# Patient Record
Sex: FEMALE | Race: WHITE
[De-identification: names, ages, dates, MRNs, and addresses within clinical notes are randomized per-mention and may not be internally consistent; named-entity substitution may affect disease eponyms.]

---

## 2017-07-31 ENCOUNTER — HOSPITAL ENCOUNTER (INPATIENT)
Dept: HOSPITAL 23 - P1E | Age: 22
LOS: 1 days | Discharge: LEFT BEFORE DISCHARGE | DRG: 885 | End: 2017-08-01
Attending: PSYCHIATRY & NEUROLOGY | Admitting: PSYCHIATRY & NEUROLOGY
Payer: COMMERCIAL

## 2017-07-31 VITALS — HEIGHT: 62 IN | BODY MASS INDEX: 24.84 KG/M2 | WEIGHT: 135 LBS

## 2017-07-31 DIAGNOSIS — F11.20: ICD-10-CM

## 2017-07-31 DIAGNOSIS — F33.2: Primary | ICD-10-CM

## 2017-07-31 PROCEDURE — HZ2ZZZZ DETOXIFICATION SERVICES FOR SUBSTANCE ABUSE TREATMENT: ICD-10-PCS | Performed by: PSYCHIATRY & NEUROLOGY

## 2017-08-01 LAB
BARBITURATES UR QL SCN: 0.3 MG/DL (ref 0.2–2)
BARBITURATES UR QL SCN: 3.6 G/DL (ref 3.5–5)
BARBITURATES: (no result)
BASOPHIL#: 0 X10E3 (ref 0–0.3)
BASOPHIL%: 0.5 % (ref 0–2.5)
BENZODIAZ UR QL SCN: 11 U/L (ref 10–40)
BENZODIAZ UR QL SCN: 13 U/L (ref 10–42)
BENZODIAZEPINES: (no result)
BLOOD UREA NITROGEN: 10 MG/DL (ref 9–23)
BUN/CREATININE RATIO: 20
BZE UR QL SCN: 65 U/L (ref 32–92)
CALCIUM SERUM: 9 MG/DL (ref 8.4–10.2)
CK MB SERPL-RTO: 13.5 % (ref 11–15.5)
CK MB SERPL-RTO: 33.3 G/DL (ref 30–36)
COCAINE: (no result)
CREATININE SERUM: 0.5 MG/DL (ref 0.6–1.4)
DIFF IND: NO
DX ICD CODE: (no result)
DX ICD CODE: (no result)
EOSINOPHIL#: 0.2 X10E3 (ref 0–0.7)
EOSINOPHIL%: 2.2 % (ref 0–7)
GLOM FILT RATE ESTIMATED: 139.3 ML/MIN (ref 60–?)
GLUCOSE FASTING: 81 MG/DL (ref 70–110)
HEMATOCRIT: 36.7 % (ref 35–45)
HEMOGLOBIN: 12.2 GM/DL (ref 12–16)
KETONES UR QL: 110 MMOL/L (ref 100–111)
KETONES UR QL: 26 MMOL/L (ref 22–31)
LYMPHOCYTE#: 2.6 X10E3 (ref 1–3.5)
LYMPHOCYTE%: 34.3 % (ref 17–45)
MEAN CELL VOLUME: 85.5 FL (ref 83–96)
MEAN CORPUSCULAR HEMOGLOBIN: 28.4 PG (ref 28–34)
MEAN PLATELET VOLUME: 9.9 FL (ref 6.5–11.5)
MONOCYTE#: 0.7 X10E3 (ref 0–1)
MONOCYTE%: 9.2 % (ref 3–12)
NEUTROPHIL#: 4 X10E3 (ref 1.5–7.1)
NEUTROPHIL%: 53.8 % (ref 40–75)
OPIATES: (no result)
PLATELET COUNT: 183 X10E3 (ref 140–420)
POTASSIUM: 4.2 MMOL/L (ref 3.5–5.1)
PROTEIN TOTAL SERUM: 6.3 G/DL (ref 6–8.3)
RED BLOOD COUNT: 4.29 X10E (ref 3.9–5.3)
SODIUM: 142 MMOL/L (ref 135–145)
TRICYCLIC ANTIDEPRESSANTS: (no result)
U HYALINE CASTS AUWI: (no result) /[LPF]
U METHADONE: (no result)
URBCS1 AUWI: (no result) /[HPF] (ref 0–2)
URINE APPEARANCE: CLEAR
URINE BACTERIA AUWI: (no result)
URINE BILIRUBIN: (no result)
URINE BLOOD: (no result)
URINE COLOR: YELLOW
URINE GLUCOSE: (no result) MG/DL
URINE KETONE: (no result)
URINE LEUKOCYTE ESTERASE: (no result)
URINE NITRATE: (no result)
URINE PH: 7.5 (ref 5–8)
URINE PROTEIN: (no result)
URINE SOURCE: (no result)
URINE SPECIFIC GRAVITY: 1.01 (ref 1–1.03)
URINE SQUAMOUS EPITHELIAL CELL: (no result) /[HPF]
URINE UROBILINOGEN: 0.2 MG/DL
UWBCS1 AUWI: (no result) (ref 0–5)
WHITE BLOOD COUNT: 7.5 X10E3 (ref 4–10.5)

## 2020-06-22 ENCOUNTER — PROCEDURE VISIT (OUTPATIENT)
Dept: OBSTETRICS AND GYNECOLOGY | Facility: CLINIC | Age: 25
End: 2020-06-22

## 2020-06-22 ENCOUNTER — INITIAL PRENATAL (OUTPATIENT)
Dept: OBSTETRICS AND GYNECOLOGY | Facility: CLINIC | Age: 25
End: 2020-06-22

## 2020-06-22 VITALS
HEIGHT: 63 IN | BODY MASS INDEX: 25.16 KG/M2 | WEIGHT: 142 LBS | DIASTOLIC BLOOD PRESSURE: 55 MMHG | SYSTOLIC BLOOD PRESSURE: 92 MMHG

## 2020-06-22 DIAGNOSIS — F11.20 BUPRENORPHINE MAINTENANCE TREATMENT AFFECTING PREGNANCY IN FIRST TRIMESTER (HCC): ICD-10-CM

## 2020-06-22 DIAGNOSIS — O99.331 MATERNAL TOBACCO USE IN FIRST TRIMESTER: ICD-10-CM

## 2020-06-22 DIAGNOSIS — Z11.3 SCREEN FOR STD (SEXUALLY TRANSMITTED DISEASE): ICD-10-CM

## 2020-06-22 DIAGNOSIS — O99.321 BUPRENORPHINE MAINTENANCE TREATMENT AFFECTING PREGNANCY IN FIRST TRIMESTER (HCC): ICD-10-CM

## 2020-06-22 DIAGNOSIS — Z78.9 VARICELLA VACCINATION STATUS UNKNOWN: ICD-10-CM

## 2020-06-22 DIAGNOSIS — O09.91 HIGH-RISK PREGNANCY IN FIRST TRIMESTER: Primary | ICD-10-CM

## 2020-06-22 DIAGNOSIS — Z34.91 PREGNANCY WITH UNCERTAIN DATES IN FIRST TRIMESTER: Primary | ICD-10-CM

## 2020-06-22 DIAGNOSIS — Z12.4 SCREENING FOR CERVICAL CANCER: ICD-10-CM

## 2020-06-22 DIAGNOSIS — B18.2 CHRONIC HEPATITIS C COMPLICATING PREGNANCY, ANTEPARTUM (HCC): ICD-10-CM

## 2020-06-22 DIAGNOSIS — O98.419 CHRONIC HEPATITIS C COMPLICATING PREGNANCY, ANTEPARTUM (HCC): ICD-10-CM

## 2020-06-22 DIAGNOSIS — Z34.91 UNCERTAIN DATES, ANTEPARTUM, FIRST TRIMESTER: ICD-10-CM

## 2020-06-22 LAB
GLUCOSE UR STRIP-MCNC: NEGATIVE MG/DL
PROT UR STRIP-MCNC: ABNORMAL MG/DL

## 2020-06-22 PROCEDURE — 76817 TRANSVAGINAL US OBSTETRIC: CPT | Performed by: OBSTETRICS & GYNECOLOGY

## 2020-06-22 PROCEDURE — 99214 OFFICE O/P EST MOD 30 MIN: CPT | Performed by: OBSTETRICS & GYNECOLOGY

## 2020-06-22 RX ORDER — BUPRENORPHINE HYDROCHLORIDE 8 MG/1
TABLET SUBLINGUAL
COMMUNITY
Start: 2020-06-16

## 2020-06-22 RX ORDER — SWAB
1 SWAB, NON-MEDICATED MISCELLANEOUS DAILY
Qty: 90 EACH | Refills: 3 | Status: SHIPPED | OUTPATIENT
Start: 2020-06-22 | End: 2020-08-26

## 2020-06-22 NOTE — PROGRESS NOTES
Initial ob visit     CC- Here for care of pregnancy     Glenna Miller is being seen today for her first obstetrical visit.  She is a 24 y.o.    10w1d gestation.     #: 1, Date: 09/29/15, Sex: Male, Weight: 2580 g (5 lb 11 oz), GA: 39w0d, Delivery: Vaginal, Spontaneous, Apgar1: None, Apgar5: None, Living: Living, Birth Comments: None    #: 2, Date: None, Sex: None, Weight: None, GA: None, Delivery: None, Apgar1: None, Apgar5: None, Living: None, Birth Comments: None      Current obstetric complaints : unsure dates   Prior obstetric issues, potential pregnancy concerns: Hep C  Family history of genetic issues (includes FOB): none  Prior infections concerning in pregnancy (Rash, fever in last 2 weeks): none  Varicella Hx -negative history  Prior testing for Cystic Fibrosis Carrier or Sickle Cell Trait- unknown status  Prepregnancy BMI - Body mass index is 25.15 kg/m².    Past Medical History:   Diagnosis Date   • Hepatitis C        History reviewed. No pertinent surgical history.      Current Outpatient Medications:   •  buprenorphine (SUBUTEX) 8 MG sublingual tablet SL tablet, DISSOLVE 1 AND A HALF TS UNDER THE TONGUE QD, Disp: , Rfl:     No Known Allergies    Social History     Socioeconomic History   • Marital status: Single     Spouse name: Not on file   • Number of children: Not on file   • Years of education: Not on file   • Highest education level: Not on file   Tobacco Use   • Smoking status: Current Every Day Smoker     Types: Cigarettes   • Smokeless tobacco: Never Used   Substance and Sexual Activity   • Alcohol use: Not Currently     Frequency: Never   • Drug use: Not Currently   • Sexual activity: Yes     Partners: Male       Family History   Problem Relation Age of Onset   • Breast cancer Neg Hx    • Ovarian cancer Neg Hx    • Uterine cancer Neg Hx    • Colon cancer Neg Hx    • Deep vein thrombosis Neg Hx    • Pulmonary embolism Neg Hx        Review of systems     Constitutional : Nausea, fatigue  "nausea present, fatigue some    : Vaginal bleeding, cramping none, none   Breast Tenderness : yes  A comprehensive review of systems was negative.     Objective    BP 92/55   Ht 160 cm (63\")   Wt 64.4 kg (142 lb)   BMI 25.15 kg/m²       General Appearance:    Alert, cooperative, in no acute distress, habitus normal    Head:    Normocephalic, without obvious abnormality, atraumatic   Eyes:            Lids and lashes normal, conjunctivae and sclerae normal, no   icterus, no pallor, corneas clear   Ears:    Ears appear intact with no abnormalities noted       Neck:   No adenopathy, supple, trachea midline, no thyromegaly   Back:     No kyphosis present, no scoliosis present,                       Lungs:     Clear to auscultation,respirations regular, even and     unlabored    Heart:    Regular rhythm and normal rate, normal S1 and S2, no            murmur, no gallop, no rub, no click   Breast Exam:    No masses, No nipple discharge   Abdomen:     Normal bowel sounds, no masses, no organomegaly, soft        non-tender, non-distended, no guarding, no rebound                 tenderness   Genitalia:    Vulva - BUS-WNL, NEFG    Vagina - No discharge, No bleeding    Cervix - No Lesions, closed     Uterus - Consistent with 7-8 weeks, midplane     Adnexa - No mass, NT    Pelvimetry - clinically adequate, gynecoid pelvis     Extremities:   Moves all extremities well, no edema, no cyanosis, no              redness   Pulses:   Pulses palpable and equal bilaterally   Skin:   No bleeding, bruising or rash   Lymph nodes:   No palpable adenopathy   Neurologic:   Sensation intact, A&O times 3      Assessment    1) Pregnancy at 10w1d   2) Uncertain dates  Trial dating scan today   HCG, quant as well.   3) Subutex use in pregnancy   Discussed [place] vs recidivism   To continue   4) Hepatitis C in pregnancy   Baseline viral load, CMP   CMP each trimester   5) Smoking in pregnancy   Tobacco use in pregnancy addressed   1) Increases risk " for IUGR, abruption   2) Infant will be exposed and therefor withdrawal once delivered and the cord is cut  3) Recommend weaning as ideal way to try and reduce risk to stop   Typically encourage to set goals every two weeks with a reduction by 50% in use of tobacco. Once down to a few a day, set quit day in the same manor.       COVID-19 and work reviewed   - per CDC and others not worse in pregnancy, but are consider a high risk category -   - unknown as to risks of vertical transmission, increased in stillbirth or miscarriage (conflicting data)   - Currently recommend only working if can abide by CDC recommendations on social distancing   - Currently recommend stopping work at least 2 weeks prior to delivery (so consider 35-37 weeks) to decrease risk of active infection during L&D           Plan    Initial labs ordered   Ultrasound ordered   Cultures ordered    Activity recommendation : 150 minutes/week of moderate intensity aerobic activity unless we limit for bleeding, hypertension or other pregnancy complication   Patient is on Prenatal vitamins  Problem list reviewed and updated.  Reviewed routine prenatal care with the office to include but not limited to   Zika (travel restrictions/ok to use insect repellant), not to changing cat litter, food restrictions, avoidance of alcohol, tobacco and drugs and saunas/hot tubs.   All questions answered.     Edu Baltazar MD   6/22/2020  13:27

## 2020-06-22 NOTE — PROGRESS NOTES
Patient tried glasses to correct vision, was unable to tolerate due to high myopia OD caused diplopia. OD has shifted more nearsighted due to cataract progression. Astigmatism present OD>OS, recommend TORIC IOL OD to correct astigmatism. May consider ROF/TORIC, will discuss lenses in further detail at [facility] appt. Ultrasound completed....SP

## 2020-06-24 ENCOUNTER — TELEPHONE (OUTPATIENT)
Dept: OBSTETRICS AND GYNECOLOGY | Facility: CLINIC | Age: 25
End: 2020-06-24

## 2020-06-24 LAB
BACTERIA UR CULT: NORMAL
BACTERIA UR CULT: NORMAL

## 2020-06-24 NOTE — TELEPHONE ENCOUNTER
"Enrico Mchugh fax to suggest PA for prenatal vitamins. Call and let them know that is RIDICULOUS - Per Epic I can not just write generic - \"prenatal vitamin\" - but that is all she needs. There should be an option on her formulary that fits that bill. Please change to that option.     Thanks  Dr. Baltazar"

## 2020-06-25 ENCOUNTER — TELEPHONE (OUTPATIENT)
Dept: OBSTETRICS AND GYNECOLOGY | Facility: CLINIC | Age: 25
End: 2020-06-25

## 2020-06-25 LAB
C TRACH RRNA SPEC QL NAA+PROBE: NEGATIVE
CONV .: NORMAL
CYTOLOGIST CVX/VAG CYTO: NORMAL
CYTOLOGY CVX/VAG DOC CYTO: NORMAL
CYTOLOGY CVX/VAG DOC THIN PREP: NORMAL
DX ICD CODE: NORMAL
HIV 1 & 2 AB SER-IMP: NORMAL
N GONORRHOEA RRNA SPEC QL NAA+PROBE: NEGATIVE
OTHER STN SPEC: NORMAL
STAT OF ADQ CVX/VAG CYTO-IMP: NORMAL
T VAGINALIS DNA SPEC QL NAA+PROBE: NEGATIVE

## 2020-06-28 LAB
AMPHETAMINES UR QL SCN: NEGATIVE NG/ML
BARBITURATES UR QL SCN: NEGATIVE NG/ML
BENZODIAZ UR QL: NEGATIVE NG/ML
BZE UR QL: NEGATIVE NG/ML
CANNABINOIDS UR CFM-MCNC: POSITIVE NG/ML
METHADONE UR QL SCN: NEGATIVE NG/ML
OPIATES UR QL: NEGATIVE NG/ML
PCP UR QL: NEGATIVE NG/ML
PROPOXYPH UR QL SCN: NEGATIVE NG/ML

## 2020-06-29 ENCOUNTER — TELEPHONE (OUTPATIENT)
Dept: OBSTETRICS AND GYNECOLOGY | Facility: CLINIC | Age: 25
End: 2020-06-29

## 2020-06-29 NOTE — TELEPHONE ENCOUNTER
----- Message from Aleksandra Morris MA sent at 6/25/2020  3:35 PM EDT -----  L/m for pt/linda  ----- Message -----  From: Edu Baltazar MD  Sent: 6/25/2020   8:42 AM EDT  To: HOMERO Gudino, please let her know the STD screen for Chlamydia, Gonorrhea and trichomoniasis is negative. Thanks, Dr. Baltazar

## 2020-07-27 ENCOUNTER — ROUTINE PRENATAL (OUTPATIENT)
Dept: OBSTETRICS AND GYNECOLOGY | Facility: CLINIC | Age: 25
End: 2020-07-27

## 2020-07-27 VITALS — BODY MASS INDEX: 24.27 KG/M2 | WEIGHT: 137 LBS | DIASTOLIC BLOOD PRESSURE: 49 MMHG | SYSTOLIC BLOOD PRESSURE: 106 MMHG

## 2020-07-27 DIAGNOSIS — O98.419 CHRONIC HEPATITIS C COMPLICATING PREGNANCY, ANTEPARTUM (HCC): ICD-10-CM

## 2020-07-27 DIAGNOSIS — F11.20 BUPRENORPHINE MAINTENANCE TREATMENT AFFECTING PREGNANCY IN SECOND TRIMESTER (HCC): ICD-10-CM

## 2020-07-27 DIAGNOSIS — B18.2 CHRONIC HEPATITIS C COMPLICATING PREGNANCY, ANTEPARTUM (HCC): ICD-10-CM

## 2020-07-27 DIAGNOSIS — Z36.9 ANTENATAL SCREENING ENCOUNTER: ICD-10-CM

## 2020-07-27 DIAGNOSIS — O99.322 BUPRENORPHINE MAINTENANCE TREATMENT AFFECTING PREGNANCY IN SECOND TRIMESTER (HCC): ICD-10-CM

## 2020-07-27 DIAGNOSIS — O09.92 HIGH-RISK PREGNANCY IN SECOND TRIMESTER: Primary | ICD-10-CM

## 2020-07-27 LAB
GLUCOSE UR STRIP-MCNC: NEGATIVE MG/DL
PROT UR STRIP-MCNC: ABNORMAL MG/DL

## 2020-07-27 PROCEDURE — 99214 OFFICE O/P EST MOD 30 MIN: CPT | Performed by: OBSTETRICS & GYNECOLOGY

## 2020-07-27 NOTE — PROGRESS NOTES
OB follow up     Glenna Miller is a 24 y.o.  15w1d being seen today for her obstetrical visit.  Patient reports no complaints. Fetal movement: normal.    Her prenatal care is complicated by (and status): Subutex, Hep C and THC/Tobacco     Review of Systems  Cramping/contractions : none   Vaginal bleeding: none   Fetal movement too early     /49   Wt 62.1 kg (137 lb)   LMP 2020 (Approximate)   BMI 24.27 kg/m²     FHT: 156 BPM   Uterine Size: 15 cm       Assessment    1) pregnancy at 15w1d   Quickening reviewed.   MSAFP-4 for down syndrome order   Ultrasound for anatomy next visit.   Cultures and ultrasound reviewed, to do blood work today   2) Subutex use - Stable - no dose change   3) Hep C - still looking for baseline   4) THC - tobacco   Discussed use in pregnancy       Plan    Reviewed this stage of pregnancy  Problem list updated   Follow up in 4 weeks    Edu Baltazar MD   2020  14:01

## 2020-07-28 LAB
ABO GROUP BLD: ABNORMAL
ALBUMIN SERPL-MCNC: 4 G/DL (ref 3.9–5)
ALBUMIN/GLOB SERPL: 1.5 {RATIO} (ref 1.2–2.2)
ALP SERPL-CCNC: 70 IU/L (ref 39–117)
ALT SERPL-CCNC: 13 IU/L (ref 0–32)
AST SERPL-CCNC: 14 IU/L (ref 0–40)
BASOPHILS # BLD AUTO: 0 X10E3/UL (ref 0–0.2)
BASOPHILS NFR BLD AUTO: 0 %
BILIRUB SERPL-MCNC: <0.2 MG/DL (ref 0–1.2)
BLD GP AB SCN SERPL QL: NEGATIVE
BUN SERPL-MCNC: 10 MG/DL (ref 6–20)
BUN/CREAT SERPL: 20 (ref 9–23)
CALCIUM SERPL-MCNC: 9.1 MG/DL (ref 8.7–10.2)
CHLORIDE SERPL-SCNC: 106 MMOL/L (ref 96–106)
CO2 SERPL-SCNC: 18 MMOL/L (ref 20–29)
CREAT SERPL-MCNC: 0.49 MG/DL (ref 0.57–1)
EOSINOPHIL # BLD AUTO: 0.1 X10E3/UL (ref 0–0.4)
EOSINOPHIL NFR BLD AUTO: 1 %
ERYTHROCYTE [DISTWIDTH] IN BLOOD BY AUTOMATED COUNT: 14.3 % (ref 11.7–15.4)
GLOBULIN SER CALC-MCNC: 2.7 G/DL (ref 1.5–4.5)
GLUCOSE SERPL-MCNC: 81 MG/DL (ref 65–99)
HBV SURFACE AG SERPL QL IA: NEGATIVE
HCG INTACT+B SERPL-ACNC: NORMAL MIU/ML
HCT VFR BLD AUTO: 35.2 % (ref 34–46.6)
HCV AB S/CO SERPL IA: 4.4 S/CO RATIO (ref 0–0.9)
HGB BLD-MCNC: 11.6 G/DL (ref 11.1–15.9)
HIV 1+2 AB+HIV1 P24 AG SERPL QL IA: NON REACTIVE
IMM GRANULOCYTES # BLD AUTO: 0 X10E3/UL (ref 0–0.1)
IMM GRANULOCYTES NFR BLD AUTO: 0 %
LYMPHOCYTES # BLD AUTO: 1.5 X10E3/UL (ref 0.7–3.1)
LYMPHOCYTES NFR BLD AUTO: 19 %
MCH RBC QN AUTO: 28.5 PG (ref 26.6–33)
MCHC RBC AUTO-ENTMCNC: 33 G/DL (ref 31.5–35.7)
MCV RBC AUTO: 87 FL (ref 79–97)
MONOCYTES # BLD AUTO: 0.4 X10E3/UL (ref 0.1–0.9)
MONOCYTES NFR BLD AUTO: 5 %
NEUTROPHILS # BLD AUTO: 5.9 X10E3/UL (ref 1.4–7)
NEUTROPHILS NFR BLD AUTO: 75 %
PLATELET # BLD AUTO: 132 X10E3/UL (ref 150–450)
POTASSIUM SERPL-SCNC: 3.9 MMOL/L (ref 3.5–5.2)
PROT SERPL-MCNC: 6.7 G/DL (ref 6–8.5)
RBC # BLD AUTO: 4.07 X10E6/UL (ref 3.77–5.28)
RH BLD: NEGATIVE
RPR SER QL: NON REACTIVE
RUBV IGG SERPL IA-ACNC: 6.14 INDEX
SODIUM SERPL-SCNC: 141 MMOL/L (ref 134–144)
VZV IGG SER IA-ACNC: <135 INDEX
WBC # BLD AUTO: 7.8 X10E3/UL (ref 3.4–10.8)

## 2020-07-29 PROBLEM — Z28.39 MATERNAL VARICELLA, NON-IMMUNE: Status: ACTIVE | Noted: 2020-07-29

## 2020-07-29 PROBLEM — O09.899 MATERNAL VARICELLA, NON-IMMUNE: Status: ACTIVE | Noted: 2020-07-29

## 2020-07-29 LAB
HCV RNA SERPL NAA+PROBE-ACNC: NORMAL IU/ML
TEST INFORMATION: NORMAL

## 2020-08-03 ENCOUNTER — TELEPHONE (OUTPATIENT)
Dept: OBSTETRICS AND GYNECOLOGY | Facility: CLINIC | Age: 25
End: 2020-08-03

## 2020-08-03 LAB
2ND TRIMESTER 4 SCREEN SERPL-IMP: ABNORMAL
2ND TRIMESTER 4 SCREEN SERPL-IMP: ABNORMAL
AFP ADJ MOM SERPL: 0.84
AFP SERPL-MCNC: 26.6 NG/ML
AGE AT DELIVERY: 24.2 YR
FET TS 18 RISK FROM MAT AGE: ABNORMAL
FET TS 21 RISK FROM MAT AGE: 1063
GA METHOD: ABNORMAL
GA: 15.4 WEEKS
HCG ADJ MOM SERPL: 1.13
HCG SERPL-ACNC: ABNORMAL MIU/ML
IDDM PATIENT QL: NO
INHIBIN A ADJ MOM SERPL: 2.3
INHIBIN A SERPL-MCNC: 428.54 PG/ML
LABORATORY COMMENT REPORT: ABNORMAL
MULTIPLE PREGNANCY: NO
NEURAL TUBE DEFECT RISK FETUS: ABNORMAL %
RESULT: ABNORMAL
TS 18 RISK FETUS: ABNORMAL
TS 21 RISK FETUS: 230
U ESTRIOL ADJ MOM SERPL: 0.54
U ESTRIOL SERPL-MCNC: 0.43 NG/ML

## 2020-08-03 NOTE — TELEPHONE ENCOUNTER
"----- Message from Edu Baltazar MD sent at 7/30/2020  9:49 AM EDT -----  Aleksandra, a few things wrong here - one tried to call and \"call could not be completed at this time\" however, in review of this result. Someone put in incorrect information! Which can affect this test - Her gestational age is 15 weeks and 3 days when drawn with EDC of 1/17/21- they seem to be using a completely different date with gestational age of 18 weeks and 4 days (based on LNMP of 3/20/20) can you have [facility] fix this and re run numbers! Thanks, Dr. Baltazar  "

## 2020-08-03 NOTE — TELEPHONE ENCOUNTER
----- Message from Aleksandra Morris MA sent at 7/31/2020  3:21 PM EDT -----  Call could not be completed/linda  ----- Message -----  From: Edu Baltazar MD  Sent: 7/29/2020   4:41 PM EDT  To: HOMERO Gudino, good news. Appears her Hep C is a prior infection, not active or chronic. Please let her know. Thanks, Dr. Baltazar

## 2020-08-26 ENCOUNTER — ROUTINE PRENATAL (OUTPATIENT)
Dept: OBSTETRICS AND GYNECOLOGY | Facility: CLINIC | Age: 25
End: 2020-08-26

## 2020-08-26 ENCOUNTER — TELEPHONE (OUTPATIENT)
Dept: OBSTETRICS AND GYNECOLOGY | Facility: CLINIC | Age: 25
End: 2020-08-26

## 2020-08-26 ENCOUNTER — PROCEDURE VISIT (OUTPATIENT)
Dept: OBSTETRICS AND GYNECOLOGY | Facility: CLINIC | Age: 25
End: 2020-08-26

## 2020-08-26 VITALS — BODY MASS INDEX: 24.45 KG/M2 | WEIGHT: 138 LBS

## 2020-08-26 DIAGNOSIS — O09.92 HIGH-RISK PREGNANCY IN SECOND TRIMESTER: Primary | ICD-10-CM

## 2020-08-26 DIAGNOSIS — D69.6 GESTATIONAL THROMBOCYTOPENIA WITHOUT HEMORRHAGE IN SECOND TRIMESTER (HCC): ICD-10-CM

## 2020-08-26 DIAGNOSIS — O99.322 BUPRENORPHINE MAINTENANCE TREATMENT AFFECTING PREGNANCY IN SECOND TRIMESTER (HCC): ICD-10-CM

## 2020-08-26 DIAGNOSIS — O99.112 GESTATIONAL THROMBOCYTOPENIA WITHOUT HEMORRHAGE IN SECOND TRIMESTER (HCC): ICD-10-CM

## 2020-08-26 DIAGNOSIS — O98.419 CHRONIC HEPATITIS C COMPLICATING PREGNANCY, ANTEPARTUM (HCC): ICD-10-CM

## 2020-08-26 DIAGNOSIS — R77.2 ABNORMAL AFP3 TEST: ICD-10-CM

## 2020-08-26 DIAGNOSIS — B18.2 CHRONIC HEPATITIS C COMPLICATING PREGNANCY, ANTEPARTUM (HCC): ICD-10-CM

## 2020-08-26 DIAGNOSIS — F11.20 BUPRENORPHINE MAINTENANCE TREATMENT AFFECTING PREGNANCY IN SECOND TRIMESTER (HCC): ICD-10-CM

## 2020-08-26 DIAGNOSIS — Z36.89 ENCOUNTER FOR FETAL ANATOMIC SURVEY: Primary | ICD-10-CM

## 2020-08-26 LAB
GLUCOSE UR STRIP-MCNC: NEGATIVE MG/DL
PROT UR STRIP-MCNC: ABNORMAL MG/DL

## 2020-08-26 PROCEDURE — 99214 OFFICE O/P EST MOD 30 MIN: CPT | Performed by: OBSTETRICS & GYNECOLOGY

## 2020-08-26 PROCEDURE — 76805 OB US >/= 14 WKS SNGL FETUS: CPT | Performed by: OBSTETRICS & GYNECOLOGY

## 2020-08-26 RX ORDER — PRENATAL VIT/IRON FUM/FOLIC AC 27MG-0.8MG
1 TABLET ORAL DAILY
COMMUNITY
Start: 2020-06-25 | End: 2023-02-06

## 2020-08-26 NOTE — TELEPHONE ENCOUNTER
"----- Message from Aleksandra Morris MA sent at 8/3/2020  4:20 PM EDT -----  Letter mailed/linda  ----- Message -----  From: Edu Baltazar MD  Sent: 8/3/2020   3:12 PM EDT  To: HOMERO Gudino, can you try and get ahold of her (letter or call). I tried again today and \"call can not be completed\"  - MSAFP-4 abnormal (+ for down syndrome, but rate is now only 1 in 230) - so we need  Her to do NIPT ([id number]) - Thanks, Dr. Baltazar    "

## 2020-08-26 NOTE — PROGRESS NOTES
OB follow up     Glenna Miller is a 24 y.o.  19w3d being seen today for her obstetrical visit.  Patient reports no complaints. Fetal movement: normal.    Her prenatal care is complicated by (and status): Subutex in pregnancy     Review of Systems  Cramping/contractions : none   Vaginal bleeding: none   Fetal movement good    Wt 62.6 kg (138 lb)   LMP 2020 (Approximate)   BMI 24.45 kg/m²     FHT: 134 BPM   Uterine Size: 18 cm       Assessment    Problems Addressed this Visit     None      Visit Diagnoses     High-risk pregnancy in second trimester    -  Primary    Buprenorphine maintenance treatment affecting pregnancy in second trimester (CMS/HCC)        Chronic hepatitis C complicating pregnancy, antepartum (CMS/HCC)        Abnormal AFP3 test        Relevant Orders    [id number] PLUS Core+SCA - Blood,    Gestational thrombocytopenia without hemorrhage in second trimester (CMS/HCC)        Relevant Orders    CBC & Differential          1) pregnancy at 19w3d   Anatomy scan reviewed, essentially normal   2) Subutex in pregnancy   Stable no changes currently   3) Hep C +   Viral load and LFTs negative - suspect she cleared infection on own.   4) Gestational thrombocytopenia   Platelets at intake 132k, repeating today with blood draw   5) Abnormal MSAFP-4   Trisomy 21 is 1:230, so check NIPT   Expectations reviewed   (had been unable to reach by phone or letter- reviewed)   6) Varicella non-immune   Varivax postpartum         Plan    Reviewed this stage of pregnancy  Problem list updated   Follow up in 4 weeks    Edu Baltazar MD   2020  12:08

## 2020-10-12 ENCOUNTER — TELEPHONE (OUTPATIENT)
Dept: OBSTETRICS AND GYNECOLOGY | Facility: CLINIC | Age: 25
End: 2020-10-12

## 2020-10-21 ENCOUNTER — ROUTINE PRENATAL (OUTPATIENT)
Dept: OBSTETRICS AND GYNECOLOGY | Facility: CLINIC | Age: 25
End: 2020-10-21

## 2020-10-21 VITALS — WEIGHT: 144 LBS | BODY MASS INDEX: 25.51 KG/M2 | DIASTOLIC BLOOD PRESSURE: 58 MMHG | SYSTOLIC BLOOD PRESSURE: 109 MMHG

## 2020-10-21 DIAGNOSIS — O26.892 RH NEGATIVE STATUS DURING PREGNANCY IN SECOND TRIMESTER: ICD-10-CM

## 2020-10-21 DIAGNOSIS — Z67.91 RH NEGATIVE STATUS DURING PREGNANCY IN SECOND TRIMESTER: ICD-10-CM

## 2020-10-21 DIAGNOSIS — F11.20 BUPRENORPHINE MAINTENANCE TREATMENT AFFECTING PREGNANCY IN SECOND TRIMESTER (HCC): ICD-10-CM

## 2020-10-21 DIAGNOSIS — Z36.9 ANTENATAL SCREENING ENCOUNTER: ICD-10-CM

## 2020-10-21 DIAGNOSIS — O09.92 HIGH-RISK PREGNANCY IN SECOND TRIMESTER: Primary | ICD-10-CM

## 2020-10-21 DIAGNOSIS — O99.322 BUPRENORPHINE MAINTENANCE TREATMENT AFFECTING PREGNANCY IN SECOND TRIMESTER (HCC): ICD-10-CM

## 2020-10-21 DIAGNOSIS — O98.419 CHRONIC HEPATITIS C COMPLICATING PREGNANCY, ANTEPARTUM (HCC): ICD-10-CM

## 2020-10-21 DIAGNOSIS — R77.2 ABNORMAL AFP3 TEST: ICD-10-CM

## 2020-10-21 DIAGNOSIS — O99.112 GESTATIONAL THROMBOCYTOPENIA WITHOUT HEMORRHAGE IN SECOND TRIMESTER (HCC): ICD-10-CM

## 2020-10-21 DIAGNOSIS — B18.2 CHRONIC HEPATITIS C COMPLICATING PREGNANCY, ANTEPARTUM (HCC): ICD-10-CM

## 2020-10-21 DIAGNOSIS — D69.6 GESTATIONAL THROMBOCYTOPENIA WITHOUT HEMORRHAGE IN SECOND TRIMESTER (HCC): ICD-10-CM

## 2020-10-21 LAB
GLUCOSE UR STRIP-MCNC: NEGATIVE MG/DL
PROT UR STRIP-MCNC: ABNORMAL MG/DL

## 2020-10-21 PROCEDURE — 96372 THER/PROPH/DIAG INJ SC/IM: CPT | Performed by: OBSTETRICS & GYNECOLOGY

## 2020-10-21 PROCEDURE — 99214 OFFICE O/P EST MOD 30 MIN: CPT | Performed by: OBSTETRICS & GYNECOLOGY

## 2020-10-21 NOTE — PROGRESS NOTES
OB follow up     Glenna Miller is a 25 y.o.  27w3d being seen today for her obstetrical visit.  Patient reports no complaints. Fetal movement: normal.    Her prenatal care is complicated by (and status): Subutex in pregnancy     Review of Systems  Cramping/contractions : none   Vaginal bleeding: none   Fetal movement good     /58   Wt 65.3 kg (144 lb)   LMP 2020 (Approximate)   BMI 25.51 kg/m²     FHT: 156 BPM   Uterine Size: 26 cm       Assessment    Problems Addressed this Visit     None      Visit Diagnoses     High-risk pregnancy in second trimester    -  Primary    Buprenorphine maintenance treatment affecting pregnancy in second trimester (CMS/HCC)        Chronic hepatitis C complicating pregnancy, antepartum (CMS/HCC)        Relevant Orders    Comprehensive Metabolic Panel    Abnormal AFP3 test        Relevant Orders    [id number] PLUS Core+SCA - Blood,    US Ob Follow Up Transabdominal Approach    Gestational thrombocytopenia without hemorrhage in second trimester (CMS/HCC)        Relevant Orders    CBC & Differential     screening encounter        Relevant Orders    CBC & Differential    Gestational Screen 1 Hr ([facility])    Rh negative status during pregnancy in second trimester        Relevant Orders    Antibody Screen      Diagnoses       Codes Comments    High-risk pregnancy in second trimester    -  Primary ICD-10-CM: O09.92  ICD-9-CM: V23.9     Buprenorphine maintenance treatment affecting pregnancy in second trimester (CMS/HCC)     ICD-10-CM: O99.322, F11.20  ICD-9-CM: 648.33, 304.00, V58.69     Chronic hepatitis C complicating pregnancy, antepartum (CMS/HCC)     ICD-10-CM: O98.419, B18.2  ICD-9-CM: 647.63, 070.54     Abnormal AFP3 test     ICD-10-CM: R77.2  ICD-9-CM: 790.99     Gestational thrombocytopenia without hemorrhage in second trimester (CMS/HCC)     ICD-10-CM: O99.112, D69.6  ICD-9-CM: 649.33, 287.5      screening encounter     ICD-10-CM:  Z36.9  ICD-9-CM: V28.9     Rh negative status during pregnancy in second trimester     ICD-10-CM: O26.892, Z67.91  ICD-9-CM: 646.83           1) pregnancy at 27w3d   GTT/CBC/ABS today  Rhogam given, no reaction noted  Peds, prenatal classes and tours  TDaP and flu recommended  Questions about L&D, anesthesia, breast feeding and birth control encouraged.   2) Subutex in pregnancy - stable - no change in dose   3) Hep C   Viral testing negative, possible old infection   Still need LFTs today and HIV, RPR in third trimester.   4) Gestational thromobcytopenia,  Will see with CBC  5) Abnormal MSAFP-4, still no NIPT can see today as well.   Check growth next visit     4) declines Flu vaccine        Plan    Reviewed this stage of pregnancy  Problem list updated   Follow up in 2 weeks    Edu Baltazar MD   10/21/2020  12:57 EDT

## 2020-10-22 LAB
ALBUMIN SERPL-MCNC: 3.7 G/DL (ref 3.9–5)
ALBUMIN/GLOB SERPL: 1.3 {RATIO} (ref 1.2–2.2)
ALP SERPL-CCNC: 115 IU/L (ref 39–117)
ALT SERPL-CCNC: 9 IU/L (ref 0–32)
AST SERPL-CCNC: 14 IU/L (ref 0–40)
BASOPHILS # BLD AUTO: 0 X10E3/UL (ref 0–0.2)
BASOPHILS NFR BLD AUTO: 0 %
BILIRUB SERPL-MCNC: <0.2 MG/DL (ref 0–1.2)
BLD GP AB SCN SERPL QL: NEGATIVE
BUN SERPL-MCNC: 11 MG/DL (ref 6–20)
BUN/CREAT SERPL: 29 (ref 9–23)
CALCIUM SERPL-MCNC: 8.4 MG/DL (ref 8.7–10.2)
CHLORIDE SERPL-SCNC: 103 MMOL/L (ref 96–106)
CO2 SERPL-SCNC: 20 MMOL/L (ref 20–29)
CREAT SERPL-MCNC: 0.38 MG/DL (ref 0.57–1)
EOSINOPHIL # BLD AUTO: 0.1 X10E3/UL (ref 0–0.4)
EOSINOPHIL NFR BLD AUTO: 1 %
ERYTHROCYTE [DISTWIDTH] IN BLOOD BY AUTOMATED COUNT: 12.6 % (ref 11.7–15.4)
GLOBULIN SER CALC-MCNC: 2.8 G/DL (ref 1.5–4.5)
GLUCOSE 1H P 50 G GLC PO SERPL-MCNC: 85 MG/DL (ref 65–139)
GLUCOSE SERPL-MCNC: 84 MG/DL (ref 65–99)
HCT VFR BLD AUTO: 31.6 % (ref 34–46.6)
HGB BLD-MCNC: 10.5 G/DL (ref 11.1–15.9)
IMM GRANULOCYTES # BLD AUTO: 0 X10E3/UL (ref 0–0.1)
IMM GRANULOCYTES NFR BLD AUTO: 0 %
LYMPHOCYTES # BLD AUTO: 2.1 X10E3/UL (ref 0.7–3.1)
LYMPHOCYTES NFR BLD AUTO: 19 %
MCH RBC QN AUTO: 29.6 PG (ref 26.6–33)
MCHC RBC AUTO-ENTMCNC: 33.2 G/DL (ref 31.5–35.7)
MCV RBC AUTO: 89 FL (ref 79–97)
MONOCYTES # BLD AUTO: 0.4 X10E3/UL (ref 0.1–0.9)
MONOCYTES NFR BLD AUTO: 4 %
NEUTROPHILS # BLD AUTO: 8.5 X10E3/UL (ref 1.4–7)
NEUTROPHILS NFR BLD AUTO: 76 %
PLATELET # BLD AUTO: 139 X10E3/UL (ref 150–450)
POTASSIUM SERPL-SCNC: 4.1 MMOL/L (ref 3.5–5.2)
PROT SERPL-MCNC: 6.5 G/DL (ref 6–8.5)
RBC # BLD AUTO: 3.55 X10E6/UL (ref 3.77–5.28)
SODIUM SERPL-SCNC: 138 MMOL/L (ref 134–144)
WBC # BLD AUTO: 11.2 X10E3/UL (ref 3.4–10.8)

## 2020-10-22 RX ORDER — FERROUS SULFATE 325(65) MG
325 TABLET ORAL
Qty: 30 TABLET | Refills: 6 | Status: SHIPPED | OUTPATIENT
Start: 2020-10-22 | End: 2021-01-15 | Stop reason: HOSPADM

## 2020-10-26 ENCOUNTER — TELEPHONE (OUTPATIENT)
Dept: OBSTETRICS AND GYNECOLOGY | Facility: CLINIC | Age: 25
End: 2020-10-26

## 2020-10-26 NOTE — TELEPHONE ENCOUNTER
EC number is not a working number.  Unable to reach pt. Will try and reach her @ next office visit/linda

## 2020-10-26 NOTE — TELEPHONE ENCOUNTER
----- Message from Aleksandra Morris MA sent at 10/23/2020  1:47 PM EDT -----  L/m for pt/linda  ----- Message -----  From: Aleksandra Morris MA  Sent: 10/22/2020   2:44 PM EDT  To: Aleksandra Morris MA    L/m for pt/linda  ----- Message -----  From: Edu Baltazar MD  Sent: 10/22/2020  11:23 AM EDT  To: HOMERO Gudino, Anemic on her BS/CBC screen. I sent in Iron to treat. Passed her glucose test. Platelets stable at 139k. Please let her know. Thanks, Dr. Baltazar

## 2020-10-28 LAB
CFDNA.FET/CFDNA.TOTAL SFR FETUS: ABNORMAL %
CITATION REF LAB TEST: ABNORMAL
FET 13+18+21+X+Y ANEUP PLAS.CFDNA: ABNORMAL
FET CHR 21 TS PLAS.CFDNA QL: ABNORMAL
FET MS X RISK WBC.DNA+CFDNA QL: ABNORMAL
FET SEX PLAS.CFDNA DOSAGE CFDNA: ABNORMAL
FET TS 13 RISK PLAS.CFDNA QL: ABNORMAL
FET TS 18 RISK WBC.DNA+CFDNA QL: ABNORMAL
FET X + Y ANEUP RISK PLAS.CFDNA SEQ-IMP: ABNORMAL
GA EST FROM CONCEPTION DATE: ABNORMAL D
GESTATIONAL AGE > 9:: ABNORMAL
LAB DIRECTOR NAME PROVIDER: ABNORMAL
LAB DIRECTOR NAME PROVIDER: ABNORMAL
LABORATORY COMMENT REPORT: ABNORMAL
LIMITATIONS OF THE TEST: ABNORMAL
NEGATIVE PREDICTIVE VALUE: ABNORMAL
NOTE: ABNORMAL
PERFORMANCE CHARACTERISTICS: ABNORMAL
POSITIVE PREDICTIVE VALUE: ABNORMAL
REF LAB TEST METHOD: ABNORMAL
TEST PERFORMANCE INFO SPEC: ABNORMAL

## 2020-10-29 ENCOUNTER — TELEPHONE (OUTPATIENT)
Dept: OBSTETRICS AND GYNECOLOGY | Facility: CLINIC | Age: 25
End: 2020-10-29

## 2020-10-29 NOTE — TELEPHONE ENCOUNTER
Aleksandra,     I guess this is the explanation. This is interesting because she is third trimester and the issues I have seen have always been first trimester.     Thanks   Dr. Baltazar

## 2020-10-29 NOTE — TELEPHONE ENCOUNTER
Debi called this morning from [facility] in regards to Gulshan's [id number] results.  Requesting a redraw for the patient, Glenna will need to wait 2 weeks from the date of the original draw.  Atleast until November 4th.  Quantity of DNA was not sufficient.

## 2020-10-30 ENCOUNTER — TELEPHONE (OUTPATIENT)
Dept: OBSTETRICS AND GYNECOLOGY | Facility: CLINIC | Age: 25
End: 2020-10-30

## 2020-10-30 NOTE — TELEPHONE ENCOUNTER
----- Message from Edu Baltazar MD sent at 10/29/2020  9:42 AM EDT -----  Aleksandra, Can you see what the problem was?  She had this for abnormal MSAFP-4 at 28 weeks, do not suspect she would not have enough fetal DNA?  Thanks, Dr. Baltazar

## 2020-11-04 ENCOUNTER — ROUTINE PRENATAL (OUTPATIENT)
Dept: OBSTETRICS AND GYNECOLOGY | Facility: CLINIC | Age: 25
End: 2020-11-04

## 2020-11-04 ENCOUNTER — TELEPHONE (OUTPATIENT)
Dept: OBSTETRICS AND GYNECOLOGY | Facility: CLINIC | Age: 25
End: 2020-11-04

## 2020-11-04 VITALS — BODY MASS INDEX: 26.22 KG/M2 | DIASTOLIC BLOOD PRESSURE: 63 MMHG | SYSTOLIC BLOOD PRESSURE: 104 MMHG | WEIGHT: 148 LBS

## 2020-11-04 DIAGNOSIS — R77.2 ABNORMAL AFP3 TEST: ICD-10-CM

## 2020-11-04 DIAGNOSIS — B18.2 CHRONIC HEPATITIS C COMPLICATING PREGNANCY, ANTEPARTUM (HCC): ICD-10-CM

## 2020-11-04 DIAGNOSIS — O40.9XX0 AFI (AMNIOTIC FLUID INDEX) INCREASED: ICD-10-CM

## 2020-11-04 DIAGNOSIS — O09.93 HIGH-RISK PREGNANCY IN THIRD TRIMESTER: Primary | ICD-10-CM

## 2020-11-04 DIAGNOSIS — O99.323 BUPRENORPHINE MAINTENANCE TREATMENT AFFECTING PREGNANCY IN THIRD TRIMESTER (HCC): ICD-10-CM

## 2020-11-04 DIAGNOSIS — O99.113 GESTATIONAL THROMBOCYTOPENIA WITHOUT HEMORRHAGE IN THIRD TRIMESTER (HCC): ICD-10-CM

## 2020-11-04 DIAGNOSIS — F11.20 BUPRENORPHINE MAINTENANCE TREATMENT AFFECTING PREGNANCY IN THIRD TRIMESTER (HCC): ICD-10-CM

## 2020-11-04 DIAGNOSIS — D69.6 GESTATIONAL THROMBOCYTOPENIA WITHOUT HEMORRHAGE IN THIRD TRIMESTER (HCC): ICD-10-CM

## 2020-11-04 DIAGNOSIS — O99.013 ANEMIA OF MOTHER IN PREGNANCY, ANTEPARTUM, THIRD TRIMESTER: ICD-10-CM

## 2020-11-04 DIAGNOSIS — O98.419 CHRONIC HEPATITIS C COMPLICATING PREGNANCY, ANTEPARTUM (HCC): ICD-10-CM

## 2020-11-04 LAB
GLUCOSE UR STRIP-MCNC: NEGATIVE MG/DL
PROT UR STRIP-MCNC: ABNORMAL MG/DL

## 2020-11-04 PROCEDURE — 99214 OFFICE O/P EST MOD 30 MIN: CPT | Performed by: OBSTETRICS & GYNECOLOGY

## 2020-11-04 NOTE — PROGRESS NOTES
OB follow up     Glenna Miller is a 25 y.o.  29w3d being seen today for her obstetrical visit.  Patient reports no complaints. Fetal movement: normal.    Her prenatal care is complicated by (and status): Subutex     Review of Systems  Cramping/contractions : None   Vaginal bleeding: none   Fetal movement good     /63   Wt 67.1 kg (148 lb)   LMP 2020 (Approximate)   BMI 26.22 kg/m²     FHT: 134 BPM   Uterine Size: 28 cm       Assessment    Diagnoses and all orders for this visit:    1. High-risk pregnancy in third trimester (Primary)    2. Buprenorphine maintenance treatment affecting pregnancy in third trimester (CMS/HCC)    3. Chronic hepatitis C complicating pregnancy, antepartum (CMS/HCC)    4. Abnormal AFP3 test    5. Gestational thrombocytopenia without hemorrhage in third trimester (CMS/HCC)    6. Anemia of mother in pregnancy, antepartum, third trimester    7. MULU (amniotic fluid index) increased        1) pregnancy at 29w3d   Rhogam done (ABS negative), passed 1 hour   2) Subutex in pregnancy   Stable   3) Hep C (likely prior infection, negative viral load)  Normal LFTs  Plan HIV/RPR this trimester  4) Anemia in pregnancy   Mild, iron - reviewed indication   5) Abnormal MSAFP-4  NIPT done, but not enough fetal DNA to read?  Growth today AGA, breech and mild increased MULU   Start ANT 32 weeks, continue serial growth scans   6) Increased MULU   Will monitor as above       Plan    Reviewed this stage of pregnancy  Problem list updated   Follow up in 2 weeks    Edu Baltazar MD   2020  14:22 EST

## 2020-11-10 ENCOUNTER — TELEPHONE (OUTPATIENT)
Dept: OBSTETRICS AND GYNECOLOGY | Facility: CLINIC | Age: 25
End: 2020-11-10

## 2020-11-12 NOTE — TELEPHONE ENCOUNTER
Left another message to call office.shantanu  
Left message to call office about exposure to Covid-19 during visit on 11/4/20.shantanu  
Patient aware.  
Pt aware to quarantine until 11/18/20 and to contact the health department if she has any symptoms.shantanu  
yes

## 2020-11-18 ENCOUNTER — ROUTINE PRENATAL (OUTPATIENT)
Dept: OBSTETRICS AND GYNECOLOGY | Facility: CLINIC | Age: 25
End: 2020-11-18

## 2020-11-18 VITALS — BODY MASS INDEX: 26.75 KG/M2 | SYSTOLIC BLOOD PRESSURE: 114 MMHG | DIASTOLIC BLOOD PRESSURE: 61 MMHG | WEIGHT: 151 LBS

## 2020-11-18 DIAGNOSIS — O99.013 ANEMIA OF MOTHER IN PREGNANCY, ANTEPARTUM, THIRD TRIMESTER: ICD-10-CM

## 2020-11-18 DIAGNOSIS — R77.2 ABNORMAL AFP3 TEST: ICD-10-CM

## 2020-11-18 DIAGNOSIS — B18.2 CHRONIC HEPATITIS C COMPLICATING PREGNANCY, ANTEPARTUM (HCC): ICD-10-CM

## 2020-11-18 DIAGNOSIS — O40.9XX0 AFI (AMNIOTIC FLUID INDEX) INCREASED: ICD-10-CM

## 2020-11-18 DIAGNOSIS — O09.93 HIGH-RISK PREGNANCY IN THIRD TRIMESTER: Primary | ICD-10-CM

## 2020-11-18 DIAGNOSIS — F11.20 BUPRENORPHINE MAINTENANCE TREATMENT AFFECTING PREGNANCY IN THIRD TRIMESTER (HCC): ICD-10-CM

## 2020-11-18 DIAGNOSIS — O98.419 CHRONIC HEPATITIS C COMPLICATING PREGNANCY, ANTEPARTUM (HCC): ICD-10-CM

## 2020-11-18 DIAGNOSIS — O99.323 BUPRENORPHINE MAINTENANCE TREATMENT AFFECTING PREGNANCY IN THIRD TRIMESTER (HCC): ICD-10-CM

## 2020-11-18 LAB
GLUCOSE UR STRIP-MCNC: NEGATIVE MG/DL
PROT UR STRIP-MCNC: ABNORMAL MG/DL

## 2020-11-18 PROCEDURE — 99214 OFFICE O/P EST MOD 30 MIN: CPT | Performed by: OBSTETRICS & GYNECOLOGY

## 2020-11-18 NOTE — PROGRESS NOTES
OB follow up     Glenna Miller is a 25 y.o.  31w3d being seen today for her obstetrical visit.  Patient reports no complaints. Fetal movement: normal.    Her prenatal care is complicated by (and status): Subutex, Anemia and abnormal MSAFP-4     Review of Systems  Cramping/contractions : None   Vaginal bleeding: None   Fetal movement Good     /61   Wt 68.5 kg (151 lb)   LMP 2020 (Approximate)   BMI 26.75 kg/m²     FHT: 144 BPM   Uterine Size: 28 cm       Assessment    Diagnoses and all orders for this visit:    1. High-risk pregnancy in third trimester (Primary)    2. Buprenorphine maintenance treatment affecting pregnancy in third trimester (CMS/HCC)    3. Chronic hepatitis C complicating pregnancy, antepartum (CMS/HCC)  -     RPR  -     HIV-1 / O / 2 Ag / Antibody 4th Generation    4. Abnormal AFP3 test  -     [id number] PLUS Core+SCA - Blood,  -     US Fetal Biophysical Profile;Without Non-Stress Testing; Standing    5. Anemia of mother in pregnancy, antepartum, third trimester    6. MULU (amniotic fluid index) increased  -     US Fetal Biophysical Profile;Without Non-Stress Testing; Standing        1) pregnancy at 31w3d   2) Subutex in pregnancy   Stable dose   3) Hep C, suspect prior infection - no viral load  Per Nursery recommendation   Ordered HIV, RPR for this trimester (can do with next lab draw)   4) Anemia   On iron, mild   5) Abnormal MSAFP-4   Repeating NIPT (not enough fetal DNA last check)   Starting weekly BPP next week   6) Increased MULU - See above       Plan    Reviewed this stage of pregnancy  Problem list updated   Follow up in 1 weeks    Edu Baltazar MD   2020  12:48 EST

## 2020-11-19 LAB
HIV 1+2 AB+HIV1 P24 AG SERPL QL IA: NON REACTIVE
RPR SER QL: NON REACTIVE

## 2020-11-20 ENCOUNTER — TELEPHONE (OUTPATIENT)
Dept: OBSTETRICS AND GYNECOLOGY | Facility: CLINIC | Age: 25
End: 2020-11-20

## 2020-11-20 ENCOUNTER — RESULTS ENCOUNTER (OUTPATIENT)
Dept: OBSTETRICS AND GYNECOLOGY | Facility: CLINIC | Age: 25
End: 2020-11-20

## 2020-11-20 DIAGNOSIS — R77.2 ABNORMAL AFP3 TEST: ICD-10-CM

## 2020-11-20 DIAGNOSIS — O40.9XX0 AFI (AMNIOTIC FLUID INDEX) INCREASED: ICD-10-CM

## 2020-11-20 NOTE — TELEPHONE ENCOUNTER
----- Message from Aleksandra Morris MA sent at 11/19/2020 12:06 PM EST -----  L/m for pt/linda  ----- Message -----  From: Edu Baltazar MD  Sent: 11/19/2020   9:59 AM EST  To: HOMERO Gudino, HIV and syphilis done for Hx of Hep C in third trimester of pregnancy, they are negative. Please let her know. Thanks, Dr. Baltazar

## 2020-11-23 ENCOUNTER — ROUTINE PRENATAL (OUTPATIENT)
Dept: OBSTETRICS AND GYNECOLOGY | Facility: CLINIC | Age: 25
End: 2020-11-23

## 2020-11-23 VITALS — WEIGHT: 154 LBS | SYSTOLIC BLOOD PRESSURE: 114 MMHG | DIASTOLIC BLOOD PRESSURE: 66 MMHG | BODY MASS INDEX: 27.28 KG/M2

## 2020-11-23 DIAGNOSIS — O40.9XX0 AFI (AMNIOTIC FLUID INDEX) INCREASED: ICD-10-CM

## 2020-11-23 DIAGNOSIS — O99.323 BUPRENORPHINE MAINTENANCE TREATMENT AFFECTING PREGNANCY IN THIRD TRIMESTER (HCC): ICD-10-CM

## 2020-11-23 DIAGNOSIS — R77.2 ABNORMAL AFP3 TEST: ICD-10-CM

## 2020-11-23 DIAGNOSIS — F11.20 BUPRENORPHINE MAINTENANCE TREATMENT AFFECTING PREGNANCY IN THIRD TRIMESTER (HCC): ICD-10-CM

## 2020-11-23 DIAGNOSIS — Z3A.32 32 WEEKS GESTATION OF PREGNANCY: ICD-10-CM

## 2020-11-23 DIAGNOSIS — O09.93 HIGH-RISK PREGNANCY IN THIRD TRIMESTER: Primary | ICD-10-CM

## 2020-11-23 LAB
GLUCOSE UR STRIP-MCNC: NEGATIVE MG/DL
PROT UR STRIP-MCNC: ABNORMAL MG/DL

## 2020-11-23 PROCEDURE — 99213 OFFICE O/P EST LOW 20 MIN: CPT | Performed by: OBSTETRICS & GYNECOLOGY

## 2020-11-23 PROCEDURE — 90715 TDAP VACCINE 7 YRS/> IM: CPT | Performed by: OBSTETRICS & GYNECOLOGY

## 2020-11-23 PROCEDURE — 90471 IMMUNIZATION ADMIN: CPT | Performed by: OBSTETRICS & GYNECOLOGY

## 2020-11-23 NOTE — PROGRESS NOTES
Chief Complaint   Patient presents with   • Routine Prenatal Visit     HPI- Pt is 25 y.o.  at 32w1d here for prenatal visit.  Patient has no complaints.  She reports adequate fetal movement.    ROS-     - No vaginal bleeding    GI- No abdominal pain    /66   Wt 69.9 kg (154 lb)   LMP 2020 (Approximate)   BMI 27.28 kg/m²   Exam - See flow sheet    Fetal heart rate is normal    Assessment-  Diagnoses and all orders for this visit:    High-risk pregnancy in third trimester    Abnormal AFP3 test  -     US Fetal Biophysical Profile;Without Non-Stress Testing    MULU (amniotic fluid index) increased  -     US Fetal Biophysical Profile;Without Non-Stress Testing    Buprenorphine maintenance treatment affecting pregnancy in third trimester (CMS/[facility])    32 weeks gestation of pregnancy    Other orders  -     POC Urinalysis Dipstick    BPP is 8 out of 8 today.  Explained to patient that she will continue with weekly ultrasounds and a growth will need to be done with her next ultrasound.  Patient had [id number] drawn last week and results are still pending.  Discussed recommendations for flu shot and Tdap and she declines the flu vaccine but will get the Tdap shot today.  She will follow-up in 2 weeks with Dr. Baltazar.    Counseling was given to patient for the following topics: diagnostic results, instructions for management and patient and family education . Total time of the encounter was 15 minutes and 10 minutes was spend counseling.

## 2020-12-04 ENCOUNTER — RESULTS ENCOUNTER (OUTPATIENT)
Dept: OBSTETRICS AND GYNECOLOGY | Facility: CLINIC | Age: 25
End: 2020-12-04

## 2020-12-04 DIAGNOSIS — O40.9XX0 AFI (AMNIOTIC FLUID INDEX) INCREASED: ICD-10-CM

## 2020-12-04 DIAGNOSIS — R77.2 ABNORMAL AFP3 TEST: ICD-10-CM

## 2020-12-09 ENCOUNTER — ROUTINE PRENATAL (OUTPATIENT)
Dept: OBSTETRICS AND GYNECOLOGY | Facility: CLINIC | Age: 25
End: 2020-12-09

## 2020-12-09 ENCOUNTER — TELEPHONE (OUTPATIENT)
Dept: OBSTETRICS AND GYNECOLOGY | Facility: CLINIC | Age: 25
End: 2020-12-09

## 2020-12-09 VITALS — WEIGHT: 152 LBS | DIASTOLIC BLOOD PRESSURE: 69 MMHG | SYSTOLIC BLOOD PRESSURE: 112 MMHG | BODY MASS INDEX: 26.93 KG/M2

## 2020-12-09 DIAGNOSIS — O98.419 CHRONIC HEPATITIS C COMPLICATING PREGNANCY, ANTEPARTUM (HCC): ICD-10-CM

## 2020-12-09 DIAGNOSIS — O99.013 ANEMIA OF MOTHER IN PREGNANCY, ANTEPARTUM, THIRD TRIMESTER: ICD-10-CM

## 2020-12-09 DIAGNOSIS — O99.323 BUPRENORPHINE MAINTENANCE TREATMENT AFFECTING PREGNANCY IN THIRD TRIMESTER (HCC): ICD-10-CM

## 2020-12-09 DIAGNOSIS — B18.2 CHRONIC HEPATITIS C COMPLICATING PREGNANCY, ANTEPARTUM (HCC): ICD-10-CM

## 2020-12-09 DIAGNOSIS — O09.93 HIGH-RISK PREGNANCY IN THIRD TRIMESTER: Primary | ICD-10-CM

## 2020-12-09 DIAGNOSIS — F11.20 BUPRENORPHINE MAINTENANCE TREATMENT AFFECTING PREGNANCY IN THIRD TRIMESTER (HCC): ICD-10-CM

## 2020-12-09 LAB
GLUCOSE UR STRIP-MCNC: NEGATIVE MG/DL
PROT UR STRIP-MCNC: ABNORMAL MG/DL

## 2020-12-09 PROCEDURE — 99214 OFFICE O/P EST MOD 30 MIN: CPT | Performed by: OBSTETRICS & GYNECOLOGY

## 2020-12-09 NOTE — TELEPHONE ENCOUNTER
----- Message from Edu Baltazar MD sent at 11/30/2020  9:58 AM EST -----  MARGARETH Lake[place] continues to not have enough DNA to test?  Please let her know. Thanks, Dr. Baltazar

## 2020-12-09 NOTE — PROGRESS NOTES
OB follow up     Glenna Miller is a 25 y.o.  34w3d being seen today for her obstetrical visit.  Patient reports no complaints. Fetal movement: normal.    Her prenatal care is complicated by (and status): Subutex, anemia, abnormal MSAFP    Review of Systems  Cramping/contractions : none   Vaginal bleeding: none  Fetal movement good     /69   Wt 68.9 kg (152 lb)   LMP 2020 (Approximate)   BMI 26.93 kg/m²     FHT: 143 BPM   Uterine Size: 32 cm       Assessment    Diagnoses and all orders for this visit:    1. High-risk pregnancy in third trimester (Primary)    2. Buprenorphine maintenance treatment affecting pregnancy in third trimester (CMS/HCC)    3. Chronic hepatitis C complicating pregnancy, antepartum (CMS/HCC)    4. Anemia of mother in pregnancy, antepartum, third trimester        1) pregnancy at 34w3d   2) Subutex in pregnancy   BP 8/8, No change in dose.   3) Hep C - likely prior infection   HIV and RPR this trimester negative for nursery   4) Anemia   Stable on oral iron, mild   5) Abnormal MSAFP-4   Still unable to get NIPT to result.   Growth today AGA 41%, A/C 68%, Normal MULU, Cephalic and BPP 8/8   Continue weekly BPP       Plan    Reviewed this stage of pregnancy  Problem list updated   Follow up in 1 weeks    Edu Baltazar MD   2020  10:06 EST

## 2020-12-11 ENCOUNTER — RESULTS ENCOUNTER (OUTPATIENT)
Dept: OBSTETRICS AND GYNECOLOGY | Facility: CLINIC | Age: 25
End: 2020-12-11

## 2020-12-11 DIAGNOSIS — O40.9XX0 AFI (AMNIOTIC FLUID INDEX) INCREASED: ICD-10-CM

## 2020-12-11 DIAGNOSIS — R77.2 ABNORMAL AFP3 TEST: ICD-10-CM

## 2020-12-16 ENCOUNTER — ROUTINE PRENATAL (OUTPATIENT)
Dept: OBSTETRICS AND GYNECOLOGY | Facility: CLINIC | Age: 25
End: 2020-12-16

## 2020-12-16 VITALS — DIASTOLIC BLOOD PRESSURE: 49 MMHG | WEIGHT: 158 LBS | BODY MASS INDEX: 27.99 KG/M2 | SYSTOLIC BLOOD PRESSURE: 109 MMHG

## 2020-12-16 DIAGNOSIS — O99.323 BUPRENORPHINE MAINTENANCE TREATMENT AFFECTING PREGNANCY IN THIRD TRIMESTER (HCC): ICD-10-CM

## 2020-12-16 DIAGNOSIS — O99.013 ANEMIA OF MOTHER IN PREGNANCY, ANTEPARTUM, THIRD TRIMESTER: ICD-10-CM

## 2020-12-16 DIAGNOSIS — R77.2 ABNORMAL AFP3 TEST: ICD-10-CM

## 2020-12-16 DIAGNOSIS — O09.93 HIGH-RISK PREGNANCY IN THIRD TRIMESTER: Primary | ICD-10-CM

## 2020-12-16 DIAGNOSIS — B18.2 CHRONIC HEPATITIS C COMPLICATING PREGNANCY, ANTEPARTUM (HCC): ICD-10-CM

## 2020-12-16 DIAGNOSIS — F11.20 BUPRENORPHINE MAINTENANCE TREATMENT AFFECTING PREGNANCY IN THIRD TRIMESTER (HCC): ICD-10-CM

## 2020-12-16 DIAGNOSIS — O98.419 CHRONIC HEPATITIS C COMPLICATING PREGNANCY, ANTEPARTUM (HCC): ICD-10-CM

## 2020-12-16 LAB
GLUCOSE UR STRIP-MCNC: NEGATIVE MG/DL
PROT UR STRIP-MCNC: ABNORMAL MG/DL

## 2020-12-16 PROCEDURE — 99214 OFFICE O/P EST MOD 30 MIN: CPT | Performed by: OBSTETRICS & GYNECOLOGY

## 2020-12-18 ENCOUNTER — RESULTS ENCOUNTER (OUTPATIENT)
Dept: OBSTETRICS AND GYNECOLOGY | Facility: CLINIC | Age: 25
End: 2020-12-18

## 2020-12-18 DIAGNOSIS — R77.2 ABNORMAL AFP3 TEST: ICD-10-CM

## 2020-12-18 DIAGNOSIS — O40.9XX0 AFI (AMNIOTIC FLUID INDEX) INCREASED: ICD-10-CM

## 2020-12-25 ENCOUNTER — RESULTS ENCOUNTER (OUTPATIENT)
Dept: OBSTETRICS AND GYNECOLOGY | Facility: CLINIC | Age: 25
End: 2020-12-25

## 2020-12-25 DIAGNOSIS — O40.9XX0 AFI (AMNIOTIC FLUID INDEX) INCREASED: ICD-10-CM

## 2020-12-25 DIAGNOSIS — R77.2 ABNORMAL AFP3 TEST: ICD-10-CM

## 2020-12-28 ENCOUNTER — ROUTINE PRENATAL (OUTPATIENT)
Dept: OBSTETRICS AND GYNECOLOGY | Facility: CLINIC | Age: 25
End: 2020-12-28

## 2020-12-28 VITALS — SYSTOLIC BLOOD PRESSURE: 116 MMHG | BODY MASS INDEX: 27.46 KG/M2 | WEIGHT: 155 LBS | DIASTOLIC BLOOD PRESSURE: 58 MMHG

## 2020-12-28 DIAGNOSIS — O99.013 ANEMIA OF MOTHER IN PREGNANCY, ANTEPARTUM, THIRD TRIMESTER: ICD-10-CM

## 2020-12-28 DIAGNOSIS — F11.20 BUPRENORPHINE MAINTENANCE TREATMENT AFFECTING PREGNANCY IN THIRD TRIMESTER (HCC): ICD-10-CM

## 2020-12-28 DIAGNOSIS — O99.323 BUPRENORPHINE MAINTENANCE TREATMENT AFFECTING PREGNANCY IN THIRD TRIMESTER (HCC): ICD-10-CM

## 2020-12-28 DIAGNOSIS — R77.2 ABNORMAL AFP3 TEST: ICD-10-CM

## 2020-12-28 DIAGNOSIS — Z36.9 ANTENATAL SCREENING ENCOUNTER: ICD-10-CM

## 2020-12-28 DIAGNOSIS — O98.419 CHRONIC HEPATITIS C COMPLICATING PREGNANCY, ANTEPARTUM (HCC): ICD-10-CM

## 2020-12-28 DIAGNOSIS — O09.93 HIGH-RISK PREGNANCY IN THIRD TRIMESTER: Primary | ICD-10-CM

## 2020-12-28 DIAGNOSIS — B18.2 CHRONIC HEPATITIS C COMPLICATING PREGNANCY, ANTEPARTUM (HCC): ICD-10-CM

## 2020-12-28 LAB
GLUCOSE UR STRIP-MCNC: NEGATIVE MG/DL
PROT UR STRIP-MCNC: ABNORMAL MG/DL

## 2020-12-28 PROCEDURE — 99214 OFFICE O/P EST MOD 30 MIN: CPT | Performed by: OBSTETRICS & GYNECOLOGY

## 2020-12-28 NOTE — PROGRESS NOTES
Ob follow up    Glenna Miller is a 25 y.o.  37w1d patient being seen today for her obstetrical visit. Patient reports no complaints. Fetal movement: normal.    Her prenatal care is complicated by (and status) : Subutex, Hep C, anemia in pregnancy, MSAFP-4 abnormal      ROS -   Fetal Movement good   Vaginal bleeding none   Cramping/Contractions intermittent      /58   Wt 70.3 kg (155 lb)   LMP 2020 (Approximate)   BMI 27.46 kg/m²     FHT:  144 BPM    Uterine Size: 32 cm   Presentations: cephalic   Pelvic Exam:     Dilation: Closed    Effacement: 50%    Station:  -2                 Assessment    Diagnoses and all orders for this visit:    1. High-risk pregnancy in third trimester (Primary)    2. Buprenorphine maintenance treatment affecting pregnancy in third trimester (CMS/HCC)    3. Chronic hepatitis C complicating pregnancy, antepartum (CMS/HCC)    4. Anemia of mother in pregnancy, antepartum, third trimester    5. Abnormal AFP3 test    6.  screening encounter  -     Strep B Screen - , Vaginal/Rectum        1) Pregnancy at 37w1d  2) Fetal status reassuring   3) GBS status - done today  4) Subutex - dose the same   Doing well and reliable.   5) Hep C- Follow up HIV and RPR negative   6) Anemia, mild and on oral Iron   7) Abnormal MSAFP-4  NIPT not enough for testing?  BPP 8/8, Cephalic with normal MULU   Repeating weekly       Plan    Labor warnings   [facility]        Edu Baltazar MD   2020  14:40 EST

## 2020-12-30 LAB — GP B STREP DNA SPEC QL NAA+PROBE: NEGATIVE

## 2021-01-06 ENCOUNTER — ROUTINE PRENATAL (OUTPATIENT)
Dept: OBSTETRICS AND GYNECOLOGY | Facility: CLINIC | Age: 26
End: 2021-01-06

## 2021-01-06 VITALS — DIASTOLIC BLOOD PRESSURE: 71 MMHG | WEIGHT: 155 LBS | SYSTOLIC BLOOD PRESSURE: 112 MMHG | BODY MASS INDEX: 27.46 KG/M2

## 2021-01-06 DIAGNOSIS — O98.419 CHRONIC HEPATITIS C COMPLICATING PREGNANCY, ANTEPARTUM (HCC): ICD-10-CM

## 2021-01-06 DIAGNOSIS — O09.93 HIGH-RISK PREGNANCY IN THIRD TRIMESTER: Primary | ICD-10-CM

## 2021-01-06 DIAGNOSIS — F11.20 BUPRENORPHINE MAINTENANCE TREATMENT AFFECTING PREGNANCY IN THIRD TRIMESTER (HCC): ICD-10-CM

## 2021-01-06 DIAGNOSIS — B18.2 CHRONIC HEPATITIS C COMPLICATING PREGNANCY, ANTEPARTUM (HCC): ICD-10-CM

## 2021-01-06 DIAGNOSIS — O99.323 BUPRENORPHINE MAINTENANCE TREATMENT AFFECTING PREGNANCY IN THIRD TRIMESTER (HCC): ICD-10-CM

## 2021-01-06 DIAGNOSIS — R77.2 ABNORMAL AFP3 TEST: ICD-10-CM

## 2021-01-06 DIAGNOSIS — O99.013 ANEMIA OF MOTHER IN PREGNANCY, ANTEPARTUM, THIRD TRIMESTER: ICD-10-CM

## 2021-01-06 LAB
GLUCOSE UR STRIP-MCNC: NEGATIVE MG/DL
PROT UR STRIP-MCNC: ABNORMAL MG/DL

## 2021-01-06 PROCEDURE — 99214 OFFICE O/P EST MOD 30 MIN: CPT | Performed by: OBSTETRICS & GYNECOLOGY

## 2021-01-06 NOTE — PROGRESS NOTES
Ob follow up    Glenna Miller is a 25 y.o.  38w3d patient being seen today for her obstetrical visit. Patient reports no complaints. Fetal movement: normal.    Her prenatal care is complicated by (and status) : Subutex, Hep C, anemia and Abnormal MSAFP-4       ROS -   Fetal Movement good   Vaginal bleeding none   Cramping/Contractions none      /71   Wt 70.3 kg (155 lb)   LMP 2020 (Approximate)   BMI 27.46 kg/m²     FHT:  126 BPM    Uterine Size: 33 cm   Presentations: cephalic   Pelvic Exam:     Dilation: 1cm    Effacement: 50%    Station:  -2                 Assessment    Diagnoses and all orders for this visit:    1. High-risk pregnancy in third trimester (Primary)    2. Abnormal AFP3 test    3. Buprenorphine maintenance treatment affecting pregnancy in third trimester (CMS/HCC)    4. Chronic hepatitis C complicating pregnancy, antepartum (CMS/HCC)    5. Anemia of mother in pregnancy, antepartum, third trimester    Other orders  -     US Fetal Biophysical Profile;Without Non-Stress Testing        1) Pregnancy at 38w3d  2) Fetal status reassuring   3) GBS status - negative   4) Subutex  Stable dose and ANT   5) Hep C, HIV and RPR follow up negative.   6) Anemia, mild and stable on oral iron   7) Abnormal MSAP-4  SGA 29%, A/C 19%, Cephalic, BPP 8/8, Normal MULU   Continue weekly BPP     Will want to see Next Monday to reassess cervix and schedule induction for 21     Plan    Labor warnings   [facility] BID        Edu Baltazar MD   2021  09:46 EST

## 2021-01-11 ENCOUNTER — PREP FOR SURGERY (OUTPATIENT)
Dept: OTHER | Facility: HOSPITAL | Age: 26
End: 2021-01-11

## 2021-01-11 ENCOUNTER — ROUTINE PRENATAL (OUTPATIENT)
Dept: OBSTETRICS AND GYNECOLOGY | Facility: CLINIC | Age: 26
End: 2021-01-11

## 2021-01-11 VITALS — DIASTOLIC BLOOD PRESSURE: 67 MMHG | SYSTOLIC BLOOD PRESSURE: 117 MMHG | WEIGHT: 159 LBS | BODY MASS INDEX: 28.17 KG/M2

## 2021-01-11 DIAGNOSIS — O99.323 BUPRENORPHINE MAINTENANCE TREATMENT AFFECTING PREGNANCY IN THIRD TRIMESTER (HCC): ICD-10-CM

## 2021-01-11 DIAGNOSIS — F11.20 BUPRENORPHINE MAINTENANCE TREATMENT AFFECTING PREGNANCY IN THIRD TRIMESTER (HCC): ICD-10-CM

## 2021-01-11 DIAGNOSIS — R77.2 ABNORMAL AFP3 TEST: ICD-10-CM

## 2021-01-11 DIAGNOSIS — O98.419 CHRONIC HEPATITIS C COMPLICATING PREGNANCY, ANTEPARTUM (HCC): ICD-10-CM

## 2021-01-11 DIAGNOSIS — O99.013 ANEMIA OF MOTHER IN PREGNANCY, ANTEPARTUM, THIRD TRIMESTER: ICD-10-CM

## 2021-01-11 DIAGNOSIS — O09.93 HIGH-RISK PREGNANCY IN THIRD TRIMESTER: Primary | ICD-10-CM

## 2021-01-11 DIAGNOSIS — B18.2 CHRONIC HEPATITIS C COMPLICATING PREGNANCY, ANTEPARTUM (HCC): ICD-10-CM

## 2021-01-11 LAB
GLUCOSE UR STRIP-MCNC: NEGATIVE MG/DL
PROT UR STRIP-MCNC: ABNORMAL MG/DL

## 2021-01-11 PROCEDURE — 99214 OFFICE O/P EST MOD 30 MIN: CPT | Performed by: OBSTETRICS & GYNECOLOGY

## 2021-01-11 RX ORDER — SODIUM CHLORIDE, SODIUM LACTATE, POTASSIUM CHLORIDE, CALCIUM CHLORIDE 600; 310; 30; 20 MG/100ML; MG/100ML; MG/100ML; MG/100ML
125 INJECTION, SOLUTION INTRAVENOUS CONTINUOUS
Status: CANCELLED | OUTPATIENT
Start: 2021-01-11

## 2021-01-11 RX ORDER — OXYTOCIN-SODIUM CHLORIDE 0.9% IV SOLN 30 UNIT/500ML 30-0.9/5 UT/ML-%
2-30 SOLUTION INTRAVENOUS
Status: CANCELLED | OUTPATIENT
Start: 2021-01-11

## 2021-01-11 RX ORDER — MAGNESIUM CARB/ALUMINUM HYDROX 105-160MG
30 TABLET,CHEWABLE ORAL ONCE
Status: CANCELLED | OUTPATIENT
Start: 2021-01-11 | End: 2021-01-11

## 2021-01-11 RX ORDER — LIDOCAINE HYDROCHLORIDE 10 MG/ML
5 INJECTION, SOLUTION EPIDURAL; INFILTRATION; INTRACAUDAL; PERINEURAL AS NEEDED
Status: CANCELLED | OUTPATIENT
Start: 2021-01-11

## 2021-01-11 RX ORDER — MISOPROSTOL 200 UG/1
800 TABLET ORAL AS NEEDED
Status: CANCELLED | OUTPATIENT
Start: 2021-01-11

## 2021-01-11 RX ORDER — OXYTOCIN-SODIUM CHLORIDE 0.9% IV SOLN 30 UNIT/500ML 30-0.9/5 UT/ML-%
250 SOLUTION INTRAVENOUS CONTINUOUS
Status: CANCELLED | OUTPATIENT
Start: 2021-01-11 | End: 2021-01-11

## 2021-01-11 RX ORDER — METHYLERGONOVINE MALEATE 0.2 MG/ML
200 INJECTION INTRAVENOUS ONCE AS NEEDED
Status: CANCELLED | OUTPATIENT
Start: 2021-01-11

## 2021-01-11 RX ORDER — OXYTOCIN-SODIUM CHLORIDE 0.9% IV SOLN 30 UNIT/500ML 30-0.9/5 UT/ML-%
999 SOLUTION INTRAVENOUS ONCE
Status: CANCELLED | OUTPATIENT
Start: 2021-01-11 | End: 2021-01-11

## 2021-01-11 RX ORDER — SODIUM CHLORIDE 0.9 % (FLUSH) 0.9 %
3 SYRINGE (ML) INJECTION EVERY 12 HOURS SCHEDULED
Status: CANCELLED | OUTPATIENT
Start: 2021-01-11

## 2021-01-11 RX ORDER — SODIUM CHLORIDE 0.9 % (FLUSH) 0.9 %
10 SYRINGE (ML) INJECTION AS NEEDED
Status: CANCELLED | OUTPATIENT
Start: 2021-01-11

## 2021-01-11 RX ORDER — CARBOPROST TROMETHAMINE 250 UG/ML
250 INJECTION, SOLUTION INTRAMUSCULAR AS NEEDED
Status: CANCELLED | OUTPATIENT
Start: 2021-01-11

## 2021-01-11 RX ORDER — OXYTOCIN-SODIUM CHLORIDE 0.9% IV SOLN 30 UNIT/500ML 30-0.9/5 UT/ML-%
125 SOLUTION INTRAVENOUS CONTINUOUS PRN
Status: CANCELLED | OUTPATIENT
Start: 2021-01-11

## 2021-01-11 NOTE — PROGRESS NOTES
Ob follow up    Glenna Miller is a 25 y.o.  39w1d patient being seen today for her obstetrical visit. Patient reports no complaints. Fetal movement: normal.    Her prenatal care is complicated by (and status) : Subutex, Hep C, Anemia and Abnormal MSAFP-4      ROS -   Fetal Movement good   Vaginal bleeding none  Cramping/Contractions intermittent      /67   Wt 72.1 kg (159 lb)   LMP 2020 (Approximate)   BMI 28.17 kg/m²     FHT:  144 BPM    Uterine Size: 34 cm   Presentations: cephalic   Pelvic Exam:     Dilation: 1cm    Effacement: 75%    Station:  -2                 Assessment    Diagnoses and all orders for this visit:    1. High-risk pregnancy in third trimester (Primary)  -     Labor Induction    2. Abnormal AFP3 test  -     Labor Induction    3. Buprenorphine maintenance treatment affecting pregnancy in third trimester (CMS/HCC)  -     Labor Induction    4. Chronic hepatitis C complicating pregnancy, antepartum (CMS/HCC)  -     Labor Induction    5. Anemia of mother in pregnancy, antepartum, third trimester  -     Labor Induction        1) Pregnancy at 39w1d  2) Fetal status reassuring   3) GBS status - negative   4) Subutex - stable on medication   5) Hep C - normal repeat HIV, RPR. No intervention   6) Anemia, mild and stable on oral iron   7) Abnormal MSAFP-4, BPP today , normal MULU and cephalic     Plan induction for Wednesday, given thinning out, will use pitocin     Plan    Labor warnings   [facility] [facility]        Edu Baltazar MD   2021  13:48 EST

## 2021-01-13 ENCOUNTER — ANESTHESIA EVENT (OUTPATIENT)
Dept: LABOR AND DELIVERY | Facility: HOSPITAL | Age: 26
End: 2021-01-13

## 2021-01-13 ENCOUNTER — ANESTHESIA (OUTPATIENT)
Dept: LABOR AND DELIVERY | Facility: HOSPITAL | Age: 26
End: 2021-01-13

## 2021-01-13 ENCOUNTER — HOSPITAL ENCOUNTER (INPATIENT)
Dept: LABOR AND DELIVERY | Facility: HOSPITAL | Age: 26
Discharge: HOME | End: 2021-01-13

## 2021-01-13 ENCOUNTER — HOSPITAL ENCOUNTER (INPATIENT)
Facility: HOSPITAL | Age: 26
LOS: 2 days | Discharge: HOME | End: 2021-01-15
Attending: OBSTETRICS & GYNECOLOGY | Admitting: OBSTETRICS & GYNECOLOGY

## 2021-01-13 DIAGNOSIS — O09.93 HIGH-RISK PREGNANCY IN THIRD TRIMESTER: ICD-10-CM

## 2021-01-13 PROBLEM — F11.20 BUPRENORPHINE MAINTENANCE TREATMENT AFFECTING PREGNANCY IN THIRD TRIMESTER (HCC): Status: ACTIVE | Noted: 2021-01-13

## 2021-01-13 PROBLEM — O98.413 CHRONIC VIRAL HEPATITIS COMPLICATING PREGNANCY IN THIRD TRIMESTER (HCC): Status: ACTIVE | Noted: 2021-01-13

## 2021-01-13 PROBLEM — B18.9 CHRONIC VIRAL HEPATITIS COMPLICATING PREGNANCY IN THIRD TRIMESTER (HCC): Status: ACTIVE | Noted: 2021-01-13

## 2021-01-13 PROBLEM — R77.2 ABNORMAL AFP3 TEST: Status: ACTIVE | Noted: 2021-01-13

## 2021-01-13 PROBLEM — O99.323 BUPRENORPHINE MAINTENANCE TREATMENT AFFECTING PREGNANCY IN THIRD TRIMESTER (HCC): Status: ACTIVE | Noted: 2021-01-13

## 2021-01-13 LAB
ABO GROUP BLD: NORMAL
AMPHET+METHAMPHET UR QL: NEGATIVE
BARBITURATES UR QL SCN: NEGATIVE
BENZODIAZ UR QL SCN: NEGATIVE
BLD GP AB SCN SERPL QL: NEGATIVE
CANNABINOIDS SERPL QL: NEGATIVE
COCAINE UR QL: NEGATIVE
DEPRECATED RDW RBC AUTO: 37.9 FL (ref 37–54)
ERYTHROCYTE [DISTWIDTH] IN BLOOD BY AUTOMATED COUNT: 12.7 % (ref 12.3–15.4)
HCT VFR BLD AUTO: 33.2 % (ref 34–46.6)
HGB BLD-MCNC: 11.2 G/DL (ref 12–15.9)
MCH RBC QN AUTO: 27.9 PG (ref 26.6–33)
MCHC RBC AUTO-ENTMCNC: 33.7 G/DL (ref 31.5–35.7)
MCV RBC AUTO: 82.8 FL (ref 79–97)
METHADONE UR QL SCN: NEGATIVE
OPIATES UR QL: NEGATIVE
OXYCODONE UR QL SCN: NEGATIVE
PLATELET # BLD AUTO: 151 10*3/MM3 (ref 140–450)
PMV BLD AUTO: 12.6 FL (ref 6–12)
RBC # BLD AUTO: 4.01 10*6/MM3 (ref 3.77–5.28)
RH BLD: NEGATIVE
SARS-COV-2 RNA RESP QL NAA+PROBE: NOT DETECTED
T&S EXPIRATION DATE: NORMAL
WBC # BLD AUTO: 12.37 10*3/MM3 (ref 3.4–10.8)

## 2021-01-13 PROCEDURE — U0003 INFECTIOUS AGENT DETECTION BY NUCLEIC ACID (DNA OR RNA); SEVERE ACUTE RESPIRATORY SYNDROME CORONAVIRUS 2 (SARS-COV-2) (CORONAVIRUS DISEASE [COVID-19]), AMPLIFIED PROBE TECHNIQUE, MAKING USE OF HIGH THROUGHPUT TECHNOLOGIES AS DESCRIBED BY CMS-2020-01-R: HCPCS | Performed by: OBSTETRICS & GYNECOLOGY

## 2021-01-13 PROCEDURE — 25010000002 ROPIVACAINE PER 1 MG: Performed by: ANESTHESIOLOGY

## 2021-01-13 PROCEDURE — 3E033VJ INTRODUCTION OF OTHER HORMONE INTO PERIPHERAL VEIN, PERCUTANEOUS APPROACH: ICD-10-PCS | Performed by: OBSTETRICS & GYNECOLOGY

## 2021-01-13 PROCEDURE — 80307 DRUG TEST PRSMV CHEM ANLYZR: CPT | Performed by: OBSTETRICS & GYNECOLOGY

## 2021-01-13 PROCEDURE — 86850 RBC ANTIBODY SCREEN: CPT | Performed by: OBSTETRICS & GYNECOLOGY

## 2021-01-13 PROCEDURE — S0260 H&P FOR SURGERY: HCPCS | Performed by: OBSTETRICS & GYNECOLOGY

## 2021-01-13 PROCEDURE — 86900 BLOOD TYPING SEROLOGIC ABO: CPT | Performed by: OBSTETRICS & GYNECOLOGY

## 2021-01-13 PROCEDURE — C1755 CATHETER, INTRASPINAL: HCPCS

## 2021-01-13 PROCEDURE — 86901 BLOOD TYPING SEROLOGIC RH(D): CPT | Performed by: OBSTETRICS & GYNECOLOGY

## 2021-01-13 PROCEDURE — 85027 COMPLETE CBC AUTOMATED: CPT | Performed by: OBSTETRICS & GYNECOLOGY

## 2021-01-13 PROCEDURE — C1755 CATHETER, INTRASPINAL: HCPCS | Performed by: ANESTHESIOLOGY

## 2021-01-13 RX ORDER — OXYTOCIN-SODIUM CHLORIDE 0.9% IV SOLN 30 UNIT/500ML 30-0.9/5 UT/ML-%
2-30 SOLUTION INTRAVENOUS
Status: DISCONTINUED | OUTPATIENT
Start: 2021-01-13 | End: 2021-01-14 | Stop reason: HOSPADM

## 2021-01-13 RX ORDER — SODIUM CHLORIDE, SODIUM LACTATE, POTASSIUM CHLORIDE, CALCIUM CHLORIDE 600; 310; 30; 20 MG/100ML; MG/100ML; MG/100ML; MG/100ML
125 INJECTION, SOLUTION INTRAVENOUS CONTINUOUS
Status: DISCONTINUED | OUTPATIENT
Start: 2021-01-13 | End: 2021-01-14

## 2021-01-13 RX ORDER — NICOTINE 21 MG/24HR
1 PATCH, TRANSDERMAL 24 HOURS TRANSDERMAL
Status: DISCONTINUED | OUTPATIENT
Start: 2021-01-13 | End: 2021-01-14

## 2021-01-13 RX ORDER — MISOPROSTOL 200 UG/1
800 TABLET ORAL AS NEEDED
Status: DISCONTINUED | OUTPATIENT
Start: 2021-01-13 | End: 2021-01-14 | Stop reason: HOSPADM

## 2021-01-13 RX ORDER — EPHEDRINE SULFATE 50 MG/ML
10 INJECTION, SOLUTION INTRAVENOUS
Status: DISCONTINUED | OUTPATIENT
Start: 2021-01-13 | End: 2021-01-14 | Stop reason: HOSPADM

## 2021-01-13 RX ORDER — ONDANSETRON 2 MG/ML
4 INJECTION INTRAMUSCULAR; INTRAVENOUS ONCE AS NEEDED
Status: DISCONTINUED | OUTPATIENT
Start: 2021-01-13 | End: 2021-01-14 | Stop reason: HOSPADM

## 2021-01-13 RX ORDER — ROPIVACAINE HYDROCHLORIDE 2 MG/ML
INJECTION, SOLUTION EPIDURAL; INFILTRATION; PERINEURAL AS NEEDED
Status: DISCONTINUED | OUTPATIENT
Start: 2021-01-13 | End: 2021-01-14 | Stop reason: SURG

## 2021-01-13 RX ORDER — CALCIUM CARBONATE 200(500)MG
1 TABLET,CHEWABLE ORAL DAILY
COMMUNITY
End: 2021-01-15 | Stop reason: HOSPADM

## 2021-01-13 RX ORDER — OXYTOCIN-SODIUM CHLORIDE 0.9% IV SOLN 30 UNIT/500ML 30-0.9/5 UT/ML-%
999 SOLUTION INTRAVENOUS ONCE
Status: COMPLETED | OUTPATIENT
Start: 2021-01-13 | End: 2021-01-14

## 2021-01-13 RX ORDER — SODIUM CHLORIDE 0.9 % (FLUSH) 0.9 %
3 SYRINGE (ML) INJECTION EVERY 12 HOURS SCHEDULED
Status: DISCONTINUED | OUTPATIENT
Start: 2021-01-13 | End: 2021-01-14 | Stop reason: HOSPADM

## 2021-01-13 RX ORDER — LIDOCAINE HYDROCHLORIDE AND EPINEPHRINE 15; 5 MG/ML; UG/ML
INJECTION, SOLUTION EPIDURAL AS NEEDED
Status: DISCONTINUED | OUTPATIENT
Start: 2021-01-13 | End: 2021-01-14 | Stop reason: SURG

## 2021-01-13 RX ORDER — CARBOPROST TROMETHAMINE 250 UG/ML
250 INJECTION, SOLUTION INTRAMUSCULAR AS NEEDED
Status: DISCONTINUED | OUTPATIENT
Start: 2021-01-13 | End: 2021-01-14 | Stop reason: HOSPADM

## 2021-01-13 RX ORDER — METHYLERGONOVINE MALEATE 0.2 MG/ML
200 INJECTION INTRAVENOUS ONCE AS NEEDED
Status: DISCONTINUED | OUTPATIENT
Start: 2021-01-13 | End: 2021-01-14 | Stop reason: HOSPADM

## 2021-01-13 RX ORDER — LIDOCAINE HYDROCHLORIDE 10 MG/ML
5 INJECTION, SOLUTION EPIDURAL; INFILTRATION; INTRACAUDAL; PERINEURAL AS NEEDED
Status: DISCONTINUED | OUTPATIENT
Start: 2021-01-13 | End: 2021-01-14 | Stop reason: HOSPADM

## 2021-01-13 RX ORDER — SODIUM CHLORIDE 0.9 % (FLUSH) 0.9 %
10 SYRINGE (ML) INJECTION AS NEEDED
Status: DISCONTINUED | OUTPATIENT
Start: 2021-01-13 | End: 2021-01-14 | Stop reason: HOSPADM

## 2021-01-13 RX ORDER — FAMOTIDINE 10 MG/ML
20 INJECTION, SOLUTION INTRAVENOUS ONCE AS NEEDED
Status: DISCONTINUED | OUTPATIENT
Start: 2021-01-13 | End: 2021-01-14 | Stop reason: HOSPADM

## 2021-01-13 RX ORDER — MAGNESIUM CARB/ALUMINUM HYDROX 105-160MG
30 TABLET,CHEWABLE ORAL ONCE
Status: COMPLETED | OUTPATIENT
Start: 2021-01-13 | End: 2021-01-14

## 2021-01-13 RX ORDER — OXYTOCIN-SODIUM CHLORIDE 0.9% IV SOLN 30 UNIT/500ML 30-0.9/5 UT/ML-%
125 SOLUTION INTRAVENOUS CONTINUOUS PRN
Status: COMPLETED | OUTPATIENT
Start: 2021-01-14 | End: 2021-01-14

## 2021-01-13 RX ORDER — OXYTOCIN-SODIUM CHLORIDE 0.9% IV SOLN 30 UNIT/500ML 30-0.9/5 UT/ML-%
250 SOLUTION INTRAVENOUS CONTINUOUS
Status: ACTIVE | OUTPATIENT
Start: 2021-01-13 | End: 2021-01-14

## 2021-01-13 RX ADMIN — SODIUM CHLORIDE, POTASSIUM CHLORIDE, SODIUM LACTATE AND CALCIUM CHLORIDE 125 ML/HR: 600; 310; 30; 20 INJECTION, SOLUTION INTRAVENOUS at 23:18

## 2021-01-13 RX ADMIN — SODIUM CHLORIDE, POTASSIUM CHLORIDE, SODIUM LACTATE AND CALCIUM CHLORIDE 125 ML/HR: 600; 310; 30; 20 INJECTION, SOLUTION INTRAVENOUS at 09:15

## 2021-01-13 RX ADMIN — OXYTOCIN 2 MILLI-UNITS/MIN: 10 INJECTION INTRAVENOUS at 09:40

## 2021-01-13 RX ADMIN — LIDOCAINE HYDROCHLORIDE AND EPINEPHRINE 3 ML: 15; 5 INJECTION, SOLUTION EPIDURAL at 18:48

## 2021-01-13 RX ADMIN — SODIUM CHLORIDE, POTASSIUM CHLORIDE, SODIUM LACTATE AND CALCIUM CHLORIDE 999 ML/HR: 600; 310; 30; 20 INJECTION, SOLUTION INTRAVENOUS at 19:00

## 2021-01-13 RX ADMIN — ROPIVACAINE HYDROCHLORIDE 10 ML: 2 INJECTION, SOLUTION EPIDURAL; INFILTRATION at 18:53

## 2021-01-13 RX ADMIN — SODIUM CHLORIDE, POTASSIUM CHLORIDE, SODIUM LACTATE AND CALCIUM CHLORIDE 125 ML/HR: 600; 310; 30; 20 INJECTION, SOLUTION INTRAVENOUS at 17:22

## 2021-01-13 RX ADMIN — EPHEDRINE SULFATE 10 MG: 50 INJECTION INTRAVENOUS at 19:08

## 2021-01-13 RX ADMIN — NICOTINE 1 PATCH: 21 PATCH, EXTENDED RELEASE TRANSDERMAL at 12:56

## 2021-01-13 NOTE — PLAN OF CARE
Problem: Adult Inpatient Plan of Care  Goal: Plan of Care Review  Outcome: Ongoing, Progressing  Flowsheets (Taken 2021 1831)  Progress: improving  Plan of Care Reviewed With:   patient   significant other  Outcome Summary: IOL for term, AROM at 1759.  Patient would like epidural.  Anticipate .     Problem: Adult Inpatient Plan of Care  Goal: Patient-Specific Goal (Individualized)  Outcome: Ongoing, Progressing  Flowsheets (Taken 2021 1831)  Patient-Specific Goals (Include Timeframe): pain control during labor, delivery, and recovery  Individualized Care Needs: epidural when indicated  Anxieties, Fears or Concerns: pain   Goal Outcome Evaluation:  Plan of Care Reviewed With: patient, significant other  Progress: improving  Outcome Summary: IOL for term, AROM at 1759.  Patient would like epidural.  Anticipate .

## 2021-01-13 NOTE — ANESTHESIA PROCEDURE NOTES
Labor Epidural      Performed By  Anesthesiologist: Zeus Delacruz MD  Preanesthetic Checklist  Completed: patient identified, site marked, surgical consent, pre-op evaluation, timeout performed, IV checked, risks and benefits discussed and monitors and equipment checked  Prep:  Pt Position:sitting  Sterile Tech:cap, gloves, mask and sterile barrier  Prep:chlorhexidine gluconate and isopropyl alcohol  Monitoring:blood pressure monitoring, continuous pulse oximetry and EKG  Epidural Block Procedure:  Approach:midline  Guidance:palpation technique  Location:L4-L5  Needle Type:Tuohy  Needle Gauge:17 G  Loss of Resistance Medium: saline  Cath Depth at skin:8 cm  Paresthesia: none  Aspiration:negative  Test Dose:negative  Number of Attempts: 1  Post Assessment:  Dressing:occlusive dressing applied and secured with tape  Pt Tolerance:patient tolerated the procedure well with no apparent complications  Complications:no

## 2021-01-13 NOTE — H&P
H&P Note    Patient Identification:  Name: Glenna Miller  Age: 25 y.o.  Sex: female  :  1995  MRN: [id number]                       Chief Complaint: Induction of labor    History of Present Illness:   25-year-old  2 para 1 presents at 39-3/7 weeks for an induction of labor.  Her course has been complicated by an abnormal AFP 3 test as well as chronic hepatitis C and buprenorphine maintenance.  Group B strep is negative.    Problem List:  [unfilled]  Past Medical History:  Past Medical History:   Diagnosis Date   • Hepatitis C      Past Surgical History:  Past Surgical History:   Procedure Laterality Date   • WISDOM TOOTH EXTRACTION        Home Meds:  Medications Prior to Admission   Medication Sig Dispense Refill Last Dose   • buprenorphine (SUBUTEX) 8 MG sublingual tablet SL tablet DISSOLVE 1 AND A HALF TS UNDER THE TONGUE QD   2021 at Unknown time   • calcium carbonate (TUMS) 500 MG chewable tablet Chew 1 tablet Daily.   2021 at Unknown time   • ferrous sulfate 325 (65 FE) MG tablet Take 1 tablet by mouth Daily With Breakfast. 30 tablet 6 2021 at Unknown time   • Prenatal Vit-Fe Fumarate-FA (PRENATAL VITAMIN 27-0.8) 27-0.8 MG tablet tablet Take 1 tablet by mouth Daily.   2021 at Unknown time     Current Meds:   [unfilled]  Allergies:  No Known Allergies  Immunizations:  Immunization History   Administered Date(s) Administered   • Rho (D) Immune Globulin 10/21/2020   • Tdap 2020     Social History:   Social History     Tobacco Use   • Smoking status: Current Every Day Smoker     Packs/day: 2.00     Types: Cigarettes   • Smokeless tobacco: Never Used   Substance Use Topics   • Alcohol use: Not Currently     Frequency: Never      Family History:  Family History   Problem Relation Age of Onset   • Thyroid disease Mother    • Heart disease Father    • Thyroid disease Maternal Grandmother    • Breast cancer Neg Hx    • Ovarian cancer Neg Hx    • Uterine cancer Neg Hx    • Colon  cancer Neg Hx    • Deep vein thrombosis Neg Hx    • Pulmonary embolism Neg Hx         Review of Systems  A comprehensive review of systems was negative.    Objective:  tMax 24 hrs: Temp (24hrs), Av.1 °F (36.7 °C), Min:98.1 °F (36.7 °C), Max:98.1 °F (36.7 °C)    Vitals Ranges:   Temp:  [98.1 °F (36.7 °C)] 98.1 °F (36.7 °C)  Heart Rate:  [58-85] 58  Resp:  [16] 16  BP: (103-108)/(56-63) 108/63  Intake and Output Last 3 Shifts:   No intake/output data recorded.    Exam:     General Appearance:    Alert, cooperative, no distress, appears stated age   Head:    Normocephalic, without obvious abnormality, atraumatic   Back:     Symmetric, no curvature, ROM normal, no CVA tenderness   Lungs:     Clear to auscultation bilaterally, respirations unlabored   Chest Wall:    No tenderness or deformity    Heart:    Regular rate and rhythm, S1 and S2 normal, no murmur, rub   or gallop       Abdomen:     Soft, non-tender, bowel sounds active all four quadrants,     no masses, no organomegaly   Genitalia:   Cervix is 1 cm dilated per RN exam       Extremities:   Extremities normal, atraumatic, no cyanosis or edema   Skin:   Skin color, texture, turgor normal, no rashes or lesions   Lymph nodes:   Cervical, supraclavicular, and axillary nodes normal       Data Review:    Lab Results (last 24 hours)     Procedure Component Value Units Date/Time    URINE DRUG SCREEN PLUS BUPRENORPHINE - [[id number]] Collected: 21     Updated: 21    Narrative:      The following orders were created for panel order URINE DRUG SCREEN PLUS BUPRENORPHINE -.  Procedure                               Abnormality         Status                     ---------                               -----------         ------                     Urine Drug Screen - Urin...[[id number]]  Normal              Final result               Buprenorphine Screen Uri...[[id number]]                      In process                   Please view results for these  tests on the individual orders.    Urine Drug Screen - Urine, Clean Catch [[id number]]  (Normal) Collected: 01/13/21 0919    Specimen: Urine, Clean Catch Updated: 01/13/21 0957     Amphet/Methamphet, Screen Negative     Barbiturates Screen, Urine Negative     Benzodiazepine Screen, Urine Negative     Cocaine Screen, Urine Negative     Opiate Screen Negative     THC, Screen, Urine Negative     Methadone Screen, Urine Negative     Oxycodone Screen, Urine Negative    Narrative:      Negative Thresholds For Drugs Screened:     Amphetamines               500 ng/ml   Barbiturates               200 ng/ml   Benzodiazepines            100 ng/ml   Cocaine                    300 ng/ml   Methadone                  300 ng/ml   Opiates                    300 ng/ml   Oxycodone                  100 ng/ml   THC                        50 ng/ml    The Normal Value for all drugs tested is negative. This report includes final unconfirmed screening results to be used for medical treatment purposes only. Unconfirmed results must not be used for non-medical purposes such as employment or legal testing. Clinical consideration should be applied to any drug of abuse test, particulary when unconfirmed results are used.    CBC (No Diff) [[id number]]  (Abnormal) Collected: 01/13/21 0914    Specimen: Blood Updated: 01/13/21 0938     WBC 12.37 10*3/mm3      RBC 4.01 10*6/mm3      Hemoglobin 11.2 g/dL      Hematocrit 33.2 %      MCV 82.8 fL      MCH 27.9 pg      MCHC 33.7 g/dL      RDW 12.7 %      RDW-SD 37.9 fl      MPV 12.6 fL      Platelets 151 10*3/mm3     Buprenorphine Screen Urine - Urine, Clean Catch [[id number]] Collected: 01/13/21 0920    Specimen: Urine, Clean Catch Updated: 01/13/21 0929    COVID PRE-OP / PRE-PROCEDURE SCREENING ORDER (NO ISOLATION) - Swab, Nasopharynx [[id number]]  (Normal) Collected: 01/13/21 0805    Specimen: Swab from Nasopharynx Updated: 01/13/21 0906    Narrative:      The following orders were created for panel order  COVID PRE-OP / PRE-PROCEDURE SCREENING ORDER (NO ISOLATION) - Swab, Nasopharynx.  Procedure                               Abnormality         Status                     ---------                               -----------         ------                     COVID-19,[facility]...[[id number]]  Normal              Final result                 Please view results for these tests on the individual orders.    COVID-19,[facility] CEPHEID, NP SWAB IN TRANSPORT MEDIA 8-12 HR TAT - Swab, Nasopharynx [[id number]]  (Normal) Collected: 01/13/21 0805    Specimen: Swab from Nasopharynx Updated: 01/13/21 0906     COVID19 Not Detected    Narrative:      Fact sheet for providers: https://www.fda.gov/media/537139/download     Fact sheet for patients: https://www.fda.gov/media/574196/download        Assessment:    Abnormal AFP3 test      1.  Intrauterine pregnancy at 39-3/7 weeks  2.  High risk pregnancy due to abnormal AFP 3 and buprenorphine maintenance as well as chronic hepatitis C.  Plan is for induction of labor.    Plan:  Induction of labor.  Anticipate a vaginal delivery.    Lázaro Joshua MD  1/13/2021

## 2021-01-13 NOTE — ANESTHESIA PREPROCEDURE EVALUATION
Anesthesia Evaluation                  Airway   Dental      Pulmonary    (+) a smoker Current,   Cardiovascular         Neuro/Psych  GI/Hepatic/Renal/Endo    (+)   hepatitis C, liver disease,     Musculoskeletal     Abdominal    Substance History   (+) drug use (in remission)     OB/GYN    (+) Pregnant (39wks 3days),         Other                        Anesthesia Plan    ASA 2

## 2021-01-13 NOTE — PROGRESS NOTES
OB note    HPI/Interval hx   25 y.o.  @ 39w3d brought for induction this morning with pitocin. Prenatal care with me complicated by Abnormal MSAFP-4 - tried to get NIPT for reassurance, but never could be run (failed twice), Subutex - has been stable on medication all pregnancy, Chronic Hep C and Mild anemia. Over today has been brennan on pitocin and getting more uncomfortable.     Temp:  [98.1 °F (36.7 °C)-98.5 °F (36.9 °C)] 98.5 °F (36.9 °C)  Heart Rate:  [52-85] 66  Resp:  [16] 16  BP: ()/(52-72) 103/67    Physical Exam  Constitutional:       General: She is not in acute distress.     Appearance: She is normal weight.   Genitourinary:      Pelvic exam was performed with patient in the lithotomy position.      No vulval lesion noted.      No vaginal bleeding.      Cervix is parous (/50/-2, AROM -clear -IUPC placed ).      Uterus is enlarged (Gravid ).   Abdominal:      General: There is distension (EFW 7# ).      Palpations: Abdomen is soft.   Musculoskeletal:      Right lower leg: Edema (edema 1+ ) present.      Left lower leg: Edema present.   Neurological:      Mental Status: She is alert.      Deep Tendon Reflexes: Reflexes normal.   Vitals signs reviewed.       Lab Results   Component Value Date    WBC 12.37 (H) 2021    HGB 11.2 (L) 2021    HCT 33.2 (L) 2021    MCV 82.8 2021     2021     COVID ND       1) pregnancy at 39w3d  2) Abnormal MSAFP-4 - Not able to get NIPT   Risk per result for down syndrome 1 in 230.   Discussed in office on 20. Have been following growth and BPP without issue.   3) Hepatitis C +   Viral load negative - no evidence of active disease (likely past infection)  Per Nursery recommendations. HIV and RPR negative in third trimester   4) Subutex use   Stable on medications during pregnancy   5) Mild anemia in pregnancy, improved at this point.   6) Maternal varicella non-immune  Recommend varivax postpartum   7) GBS negative and  fetal status reassuring.     Edu Baltazar MD  1/13/2021  18:47 EST

## 2021-01-14 PROCEDURE — 0KQM0ZZ REPAIR PERINEUM MUSCLE, OPEN APPROACH: ICD-10-PCS | Performed by: OBSTETRICS & GYNECOLOGY

## 2021-01-14 PROCEDURE — 0UQMXZZ REPAIR VULVA, EXTERNAL APPROACH: ICD-10-PCS | Performed by: OBSTETRICS & GYNECOLOGY

## 2021-01-14 PROCEDURE — 59410 OBSTETRICAL CARE: CPT | Performed by: OBSTETRICS & GYNECOLOGY

## 2021-01-14 PROCEDURE — 10907ZC DRAINAGE OF AMNIOTIC FLUID, THERAPEUTIC FROM PRODUCTS OF CONCEPTION, VIA NATURAL OR ARTIFICIAL OPENING: ICD-10-PCS | Performed by: OBSTETRICS & GYNECOLOGY

## 2021-01-14 RX ORDER — HYDROXYZINE 50 MG/1
50 TABLET, FILM COATED ORAL NIGHTLY PRN
Status: DISCONTINUED | OUTPATIENT
Start: 2021-01-14 | End: 2021-01-15 | Stop reason: HOSPADM

## 2021-01-14 RX ORDER — PRENATAL VIT/IRON FUM/FOLIC AC 27MG-0.8MG
1 TABLET ORAL DAILY
Status: DISCONTINUED | OUTPATIENT
Start: 2021-01-14 | End: 2021-01-15 | Stop reason: HOSPADM

## 2021-01-14 RX ORDER — HYDROCORTISONE 25 MG/G
1 CREAM TOPICAL AS NEEDED
Status: DISCONTINUED | OUTPATIENT
Start: 2021-01-14 | End: 2021-01-15 | Stop reason: HOSPADM

## 2021-01-14 RX ORDER — HYDROCODONE BITARTRATE AND ACETAMINOPHEN 5; 325 MG/1; MG/1
1 TABLET ORAL EVERY 4 HOURS PRN
Status: DISCONTINUED | OUTPATIENT
Start: 2021-01-14 | End: 2021-01-15 | Stop reason: HOSPADM

## 2021-01-14 RX ORDER — PROMETHAZINE HYDROCHLORIDE 12.5 MG/1
12.5 TABLET ORAL EVERY 4 HOURS PRN
Status: DISCONTINUED | OUTPATIENT
Start: 2021-01-14 | End: 2021-01-15 | Stop reason: HOSPADM

## 2021-01-14 RX ORDER — SODIUM CHLORIDE 0.9 % (FLUSH) 0.9 %
1-10 SYRINGE (ML) INJECTION AS NEEDED
Status: DISCONTINUED | OUTPATIENT
Start: 2021-01-14 | End: 2021-01-15 | Stop reason: HOSPADM

## 2021-01-14 RX ORDER — IBUPROFEN 800 MG/1
800 TABLET ORAL EVERY 8 HOURS PRN
Status: DISCONTINUED | OUTPATIENT
Start: 2021-01-14 | End: 2021-01-15 | Stop reason: HOSPADM

## 2021-01-14 RX ORDER — BISACODYL 10 MG
10 SUPPOSITORY, RECTAL RECTAL DAILY PRN
Status: DISCONTINUED | OUTPATIENT
Start: 2021-01-15 | End: 2021-01-15 | Stop reason: HOSPADM

## 2021-01-14 RX ORDER — ERYTHROMYCIN 5 MG/G
OINTMENT OPHTHALMIC
Status: DISPENSED
Start: 2021-01-14 | End: 2021-01-14

## 2021-01-14 RX ORDER — PHYTONADIONE 1 MG/.5ML
INJECTION, EMULSION INTRAMUSCULAR; INTRAVENOUS; SUBCUTANEOUS
Status: DISPENSED
Start: 2021-01-14 | End: 2021-01-14

## 2021-01-14 RX ORDER — DOCUSATE SODIUM 100 MG/1
100 CAPSULE, LIQUID FILLED ORAL 2 TIMES DAILY
Status: DISCONTINUED | OUTPATIENT
Start: 2021-01-14 | End: 2021-01-15 | Stop reason: HOSPADM

## 2021-01-14 RX ORDER — MISOPROSTOL 200 UG/1
800 TABLET ORAL AS NEEDED
Status: DISCONTINUED | OUTPATIENT
Start: 2021-01-14 | End: 2021-01-15 | Stop reason: HOSPADM

## 2021-01-14 RX ADMIN — BUPRENORPHINE HCL 12 MG: 8 TABLET SUBLINGUAL at 09:06

## 2021-01-14 RX ADMIN — OXYTOCIN 999 ML/HR: 10 INJECTION INTRAVENOUS at 02:35

## 2021-01-14 RX ADMIN — IBUPROFEN 800 MG: 800 TABLET, FILM COATED ORAL at 13:01

## 2021-01-14 RX ADMIN — DOCUSATE SODIUM 100 MG: 100 CAPSULE, LIQUID FILLED ORAL at 09:06

## 2021-01-14 RX ADMIN — Medication 1 TABLET: at 09:06

## 2021-01-14 RX ADMIN — OXYTOCIN 125 ML/HR: 10 INJECTION INTRAVENOUS at 04:02

## 2021-01-14 RX ADMIN — IBUPROFEN 800 MG: 800 TABLET, FILM COATED ORAL at 04:23

## 2021-01-14 RX ADMIN — DOCUSATE SODIUM 100 MG: 100 CAPSULE, LIQUID FILLED ORAL at 21:29

## 2021-01-14 RX ADMIN — IBUPROFEN 800 MG: 800 TABLET, FILM COATED ORAL at 21:29

## 2021-01-14 RX ADMIN — MINERAL OIL 30 ML: 1000 SOLUTION ORAL at 02:30

## 2021-01-14 RX ADMIN — Medication: at 06:54

## 2021-01-14 NOTE — PAYOR COMM NOTE
"[facility]  [address] [place] [address]  Facility NPI: [id number]  Aline Loredo  Fax: [phone]  Phone: [phone] (Corina: [phone], Yaquelin: 6136)  Email: [email or web address]    Date: 2021  To: [facility]   Fax: [phone]  Subject: DELIVERY NOTE   Reference #:[id number]    Please don't hesitate to contact me with any questions or concerns.    Scout Miller (25 y.o. Female)     Date of Birth Social Security Number Address Home Phone MRN    1995  [address] [place] [id number]  [id number]    Gnosticism Marital Status          Unknown Single       Admission Date Admission Type Admitting Provider Attending Provider Department, Room/Bed    21 Elective Edu Baltazar MD [place], Jeremy James, MD [address], E365/1    Discharge Date Discharge Disposition Discharge Destination                       Attending Provider: Edu Baltazar MD    Allergies: No Known Allergies    Isolation: None   Infection: None   Code Status: CPR    Ht: 157.5 cm (62\")   Wt: 73.7 kg (162 lb 6.4 oz)    Admission Cmt: None   Principal Problem: Normal vaginal delivery [O80]                 Active Insurance as of 2021     Primary Coverage     Payor Plan Insurance Group Employer/Plan Group    [facility] MEDICAID AXY     Payor Plan Address Payor Plan Phone Number Payor Plan Fax Number Effective Dates     [address] [address] [phone]  2020 - None Entered    [facility] [id number]       Subscriber Name Subscriber Birth Date Member ID       SCOUT MILLER 1995 [id number]                 Emergency Contacts      (Rel.) Home Phone Work Phone Mobile Phone    LONG WHITMORE (Significant Other) [phone] -- --               History & Physical      Lázaro Joshua MD at 21 1009          H&P Note    Patient Identification:  Name: Scout Miller  Age: 25 y.o.  Sex: female  :  1995  MRN: [id number]                   "     Chief Complaint: Induction of labor    History of Present Illness:   25-year-old  2 para 1 presents at 39-3/7 weeks for an induction of labor.  Her course has been complicated by an abnormal AFP 3 test as well as chronic hepatitis C and buprenorphine maintenance.  Group B strep is negative.    Problem List:  [unfilled]  Past Medical History:  Past Medical History:   Diagnosis Date   • Hepatitis C      Past Surgical History:  Past Surgical History:   Procedure Laterality Date   • WISDOM TOOTH EXTRACTION        Home Meds:  Medications Prior to Admission   Medication Sig Dispense Refill Last Dose   • buprenorphine (SUBUTEX) 8 MG sublingual tablet SL tablet DISSOLVE 1 AND A HALF TS UNDER THE TONGUE QD   2021 at Unknown time   • calcium carbonate (TUMS) 500 MG chewable tablet Chew 1 tablet Daily.   2021 at Unknown time   • ferrous sulfate 325 (65 FE) MG tablet Take 1 tablet by mouth Daily With Breakfast. 30 tablet 6 2021 at Unknown time   • Prenatal Vit-Fe Fumarate-FA (PRENATAL VITAMIN 27-0.8) 27-0.8 MG tablet tablet Take 1 tablet by mouth Daily.   2021 at Unknown time     Current Meds:   [unfilled]  Allergies:  No Known Allergies  Immunizations:  Immunization History   Administered Date(s) Administered   • Rho (D) Immune Globulin 10/21/2020   • Tdap 2020     Social History:   Social History     Tobacco Use   • Smoking status: Current Every Day Smoker     Packs/day: 2.00     Types: Cigarettes   • Smokeless tobacco: Never Used   Substance Use Topics   • Alcohol use: Not Currently     Frequency: Never      Family History:  Family History   Problem Relation Age of Onset   • Thyroid disease Mother    • Heart disease Father    • Thyroid disease Maternal Grandmother    • Breast cancer Neg Hx    • Ovarian cancer Neg Hx    • Uterine cancer Neg Hx    • Colon cancer Neg Hx    • Deep vein thrombosis Neg Hx    • Pulmonary embolism Neg Hx         Review of Systems  A comprehensive review of systems  was negative.    Objective:  tMax 24 hrs: Temp (24hrs), Av.1 °F (36.7 °C), Min:98.1 °F (36.7 °C), Max:98.1 °F (36.7 °C)    Vitals Ranges:   Temp:  [98.1 °F (36.7 °C)] 98.1 °F (36.7 °C)  Heart Rate:  [58-85] 58  Resp:  [16] 16  BP: (103-108)/(56-63) 108/63  Intake and Output Last 3 Shifts:   No intake/output data recorded.    Exam:     General Appearance:    Alert, cooperative, no distress, appears stated age   Head:    Normocephalic, without obvious abnormality, atraumatic   Back:     Symmetric, no curvature, ROM normal, no CVA tenderness   Lungs:     Clear to auscultation bilaterally, respirations unlabored   Chest Wall:    No tenderness or deformity    Heart:    Regular rate and rhythm, S1 and S2 normal, no murmur, rub   or gallop       Abdomen:     Soft, non-tender, bowel sounds active all four quadrants,     no masses, no organomegaly   Genitalia:   Cervix is 1 cm dilated per RN exam       Extremities:   Extremities normal, atraumatic, no cyanosis or edema   Skin:   Skin color, texture, turgor normal, no rashes or lesions   Lymph nodes:   Cervical, supraclavicular, and axillary nodes normal       Data Review:    Lab Results (last 24 hours)     Procedure Component Value Units Date/Time    URINE DRUG SCREEN PLUS BUPRENORPHINE - [[id number]] Collected: 21     Updated: 21    Narrative:      The following orders were created for panel order URINE DRUG SCREEN PLUS BUPRENORPHINE -.  Procedure                               Abnormality         Status                     ---------                               -----------         ------                     Urine Drug Screen - Urin...[[id number]]  Normal              Final result               Buprenorphine Screen Uri...[[id number]]                      In process                   Please view results for these tests on the individual orders.    Urine Drug Screen - Urine, Clean Catch [[id number]]  (Normal) Collected: 21    Specimen: Urine,  Clean Catch Updated: 01/13/21 0957     Amphet/Methamphet, Screen Negative     Barbiturates Screen, Urine Negative     Benzodiazepine Screen, Urine Negative     Cocaine Screen, Urine Negative     Opiate Screen Negative     THC, Screen, Urine Negative     Methadone Screen, Urine Negative     Oxycodone Screen, Urine Negative    Narrative:      Negative Thresholds For Drugs Screened:     Amphetamines               500 ng/ml   Barbiturates               200 ng/ml   Benzodiazepines            100 ng/ml   Cocaine                    300 ng/ml   Methadone                  300 ng/ml   Opiates                    300 ng/ml   Oxycodone                  100 ng/ml   THC                        50 ng/ml    The Normal Value for all drugs tested is negative. This report includes final unconfirmed screening results to be used for medical treatment purposes only. Unconfirmed results must not be used for non-medical purposes such as employment or legal testing. Clinical consideration should be applied to any drug of abuse test, particulary when unconfirmed results are used.    CBC (No Diff) [[id number]]  (Abnormal) Collected: 01/13/21 0914    Specimen: Blood Updated: 01/13/21 0938     WBC 12.37 10*3/mm3      RBC 4.01 10*6/mm3      Hemoglobin 11.2 g/dL      Hematocrit 33.2 %      MCV 82.8 fL      MCH 27.9 pg      MCHC 33.7 g/dL      RDW 12.7 %      RDW-SD 37.9 fl      MPV 12.6 fL      Platelets 151 10*3/mm3     Buprenorphine Screen Urine - Urine, Clean Catch [[id number]] Collected: 01/13/21 0920    Specimen: Urine, Clean Catch Updated: 01/13/21 0929    COVID PRE-OP / PRE-PROCEDURE SCREENING ORDER (NO ISOLATION) - Swab, Nasopharynx [[id number]]  (Normal) Collected: 01/13/21 0805    Specimen: Swab from Nasopharynx Updated: 01/13/21 0906    Narrative:      The following orders were created for panel order COVID PRE-OP / PRE-PROCEDURE SCREENING ORDER (NO ISOLATION) - Swab, Nasopharynx.  Procedure                               Abnormality          Status                     ---------                               -----------         ------                     COVID-19, JULIAN IN-HOUSE...[[id number]]  Normal              Final result                 Please view results for these tests on the individual orders.    COVID-19, JULIAN IN-HOUSE CEPHEID, NP SWAB IN TRANSPORT MEDIA 8-12 HR TAT - Swab, Nasopharynx [[id number]]  (Normal) Collected: 21    Specimen: Swab from Nasopharynx Updated: 21     COVID19 Not Detected    Narrative:      Fact sheet for providers: https://www.fda.gov/media/502641/download     Fact sheet for patients: https://www.fda.gov/media/217526/download        Assessment:    Abnormal AFP3 test      1.  Intrauterine pregnancy at 39-3/7 weeks  2.  High risk pregnancy due to abnormal AFP 3 and buprenorphine maintenance as well as chronic hepatitis C.  Plan is for induction of labor.    Plan:  Induction of labor.  Anticipate a vaginal delivery.    Lázaro Joshua MD  2021    Electronically signed by Lázaro Joshua MD at 21 1012          Operative/Procedure Notes (all)      Edu Baltazar MD at 21 0304  Version 2 of 2         [facility]  Vaginal Delivery Note    Delivery    Predelivery Diagnoses: 1) Pregnancy at 39w4d                                          2) Abnormal MSAFP-4                                          3) Buprenorphine use in pregnancy                                           4) Hepatitis C in pregnancy                    Postdelivery Diagnoses 1) Pregnancy at 39w4d                                            2) Abnormal MSAFP-4                                          3) Buprenorphine use in pregnancy                                           4) Hepatitis C in pregnancy    Attending :  Edu Baltazar MD     Procedure : Obstetrical controlled vaginal delivery    Delivery Narrative :     Glenna Miller is a 25 y.o. year old  @ 39w4d estimated gestational age. She presented to L&D  for induction of labor.   Her prenatal care was with Dr. Baltazar and was complicated by 1) Abnormal MSAFP-4, risk of Trisomy 21 was 1:230, not able to get NIPT (too little specimen) x 2 - normal growth and BPP weekly, 2) buprenorphine use - stable use in pregnancy aware risks of discontinue and  withdrawal, 3) Hx of Hep C- negative viral count in early pregnancy, normal HIV and RPR in third trimester. Once on L&D her labor progressed well along the labor curve with the aid pitocin, amniotomy with IUPC and epidural interventions.  Once she was to the second stage, I was called for delivery.     Upon my arrival she was prepped in the lithotomy position, and was doing well in her pushing efforts. With delivery of the fetal head in OA presentation, bulb suction was performed, then using a garcia type maneuver, pressure was placed along the posterior aspect of the anterior shoulder and it delivered without difficulty. No nuchal cord noted.  The infant showed good cry and tone and was placed upon the Mother's abdomen.  After a thirty second delay, I then clamped the cord and it was cut by the father of the baby.  Care of the infant was then turned over to the delivery team. They do not report any major concerning findings for Down syndrome on initial inspection. Cord blood was obtained and then using gentle pressure the placenta was delivered spontaneous/intact and noted to have 3 vessel cord.  At that point I undertook inspection of the perineum and vulva and I repaired second degree perineal laceration and right labial laceration using 3-0 Monocryl in standard fashion with good hemostatic and cosmetic results.       After repair of all lacerations, the area was noted to be hemostatic and all sponge and needle counts were correct. A vaginal exam and rectal exam showed no issues with retained equipment or suture in an abnormal place.      There was one family member(s) noted to be in the room with this patient.             Delivery: Vaginal, Spontaneous     YOB: 2020    Time of Birth: 2:32 AM      Anesthesia: Epidural             EBL: 150 cc           Infant    Findings: female  infant     Infant observations: Weight: No birth weight on file.   Length:   in  Observations/Comments:  Scale 4      Apgars: 8  @ 1 minute /    9  @ 5 minutes       Complications  none    Disposition  Mother to Mother Baby/Postpartum  in stable condition currently.  Baby to remains with mom  in stable condition currently.      Edu Baltazar MD  21  03:04 EST          Electronically signed by Edu Baltazar MD at 21 0310     Edu Baltazar MD at 21 0304  Version 1 of 2         [facility]  Vaginal Delivery Note    Delivery    Predelivery Diagnoses: 1) Pregnancy at 39w4d                                          2) Abnormal MSAFP-4                                          3) Buprenorphine use in pregnancy                                           4) Hepatitis C in pregnancy                    Postdelivery Diagnoses 1) Pregnancy at 39w4d                                            2) Abnormal MSAFP-4                                          3) Buprenorphine use in pregnancy                                           4) Hepatitis C in pregnancy    Attending :  Edu Baltazar MD     Procedure : Obstetrical controlled vaginal delivery    Delivery Narrative :     Glenna Miller is a 25 y.o. year old  @ 39w4d estimated gestational age. She presented to L&D for induction of labor.   Her prenatal care was with Dr. Baltazar and was complicated by 1) Abnormal MSAFP-4, risk of Trisomy 21 was 1:230, not able to get NIPT (too little specimen) x 2 - normal growth and BPP weekly, 2) buprenorphine use - stable use in pregnancy aware risks of discontinue and  withdrawal, 3) Hx of Hep C- negative viral count in early pregnancy, normal HIV and RPR in third trimester. Once on L&D her labor progressed  well along the labor curve with the aid pitocin, amniotomy with IUPC and epidural interventions.  Once she was to the second stage, I was called for delivery.     Upon my arrival she was prepped in the lithotomy position, and was doing well in her pushing efforts. With delivery of the fetal head in OA presentation, bulb suction was performed, then using a garcia type maneuver, pressure was placed along the posterior aspect of the anterior shoulder and it delivered without difficulty. No nuchal cord noted.  The infant showed good cry and tone and was placed upon the Mother's abdomen.  After a thirty second delay, I then clamped the cord and it was cut by the father of the baby.  Care of the infant was then turned over to the delivery team. They do not report any major concerning findings for Down syndrome on initial inspection. Cord blood was obtained and then using gentle pressure the placenta was delivered spontaneous/intact and noted to have 3 vessel cord.  At that point I undertook inspection of the perineum and vulva and I repaired second degree perineal laceration and right labial laceration using 3-0 Monocryl in standard fashion with good hemostatic and cosmetic results.       After repair of all lacerations, the area was noted to be hemostatic and all sponge and needle counts were correct. A vaginal exam and rectal exam showed no issues with retained equipment or suture in an abnormal place.      There was one family member(s) noted to be in the room with this patient.            Delivery: Vaginal, Spontaneous     YOB: 2020    Time of Birth: 2:32 AM      Anesthesia: Epidural             EBL: 150 cc           Infant    Findings: female  infant     Infant observations: Weight: No birth weight on file.   Length:   in  Observations/Comments:  Scale 4      Apgars: 8  @ 1 minute /    9  @ 5 minutes       Complications  none    Disposition  Mother to Mother Baby/Postpartum  in stable condition  currently.  Baby to remains with mom  in stable condition currently.      Edu Baltazar MD  01/14/21  03:04 EST          Electronically signed by Delaney, Edu Sesay MD at 01/14/21 0309         Maternal Vitals (since admission)     Date/Time   Temp   Pulse   Resp   BP   SpO2   Weight    01/14/21 0652   98.2 (36.8)   53   16   127/78   --   --    01/14/21 0535   98.4 (36.9)   57   16   115/56   --   --    01/14/21 0455   98.4 (36.9)   62   16   134/80   --   --    01/14/21 0432   --   65   --   124/71   --   --    01/14/21 0430   --   --   16   --   --   --    01/14/21 0416   --   57   --   111/72   --   --    01/14/21 0415   --   --   16   --   --   --    01/14/21 0401   --   56   --   110/74   --   --    01/14/21 0400   --   --   16   --   --   --    01/14/21 0347   --   58   --   111/69   --   --    01/14/21 0345   --   --   16   --   --   --    01/14/21 0330   --   57   --   124/65   --   --    01/14/21 0315   --   81   16   146/65   --   --    01/14/21 0300   --   62   16   113/71   --   --    01/14/21 0245   98.2 (36.8)   81   16   119/50   --   --    01/14/21 0230   --   74   --   130/63   --   --    01/14/21 0215   --   60   --   109/47   --   --    01/14/21 0201   --   61   --   95/51   --   --    01/14/21 0017   --   113   --   111/82   --   --    01/14/21 0001   --   57   --   107/65   --   --    01/13/21 2346   --   55   --   106/58   --   --    01/13/21 2317   --   72   --   101/58   --   --    01/13/21 2217   --   66   --   93/55   --   --    01/13/21 2201   --   65   --   102/58   --   --    01/13/21 2200   98.1 (36.7)   --   16   --   --   --    01/13/21 2146   --   60   --   110/73   --   --    01/13/21 2131   --   63   --   105/59   --   --    01/13/21 2116   --   53   --   106/63   --   --    01/13/21 2100   --   71   --   95/79   --   --    01/13/21 2016   --   64   --   105/61   --   --    01/13/21 2001   --   56   --   99/57   --   --    01/13/21 1956   97.8 (36.6)   --   16   --   --    --    01/13/21 1947   --   68   --   97/46   --   --    01/13/21 1932   --   70   --   102/50   --   --    01/13/21 1917   --   70   --   100/59   --   --    01/13/21 1911   --   61   --   127/65   --   --    01/13/21 1907   --   78   --   (!) 83/60   --   --    01/13/21 1902   --   60   --   109/49   --   --    01/13/21 1900   --   62   --   110/68   --   --    01/13/21 1857   --   56   --   108/66   --   --    01/13/21 1853   --   61   --   111/64   --   --    01/13/21 1852   --   69   --   108/64   --   --    01/13/21 1849   --   64   --   120/72   --   --    01/13/21 1831   --   55   --   108/63   --   --    01/13/21 1803   98.5 (36.9)   66   16   103/67   --   --    01/13/21 1732   --   64   --   107/69   --   --    01/13/21 1702   --   53   --   110/65   --   --    01/13/21 1632   --   52   --   122/72   --   --    01/13/21 1601   98.5 (36.9)   53   16   102/60   --   --    01/13/21 1531   --   56   --   109/55   --   --    01/13/21 1502   --   72   --   103/63   --   --    01/13/21 1431   --   53   --   103/65   --   --    01/13/21 1401   --   73   --   102/62   --   --    01/13/21 1331   --   59   --   98/58   --   --    01/13/21 1301   --   68   --   95/57   --   --    01/13/21 1231   --   57   --   102/62   --   --    01/13/21 1201   98.1 (36.7)   55   16   107/61   --   --    01/13/21 1152   --   --   --   --   --   --    Comment rows:    OBSERV: Pharmacy called to ask if they could dispense nicotine patient so RN could supply to patient.  Pharmacy tech is out now to bring medications to floor.  If RN does not have medication by 1220, RN to come to pharmacy for pharmacist to dispense another dose to RN. at 01/13/21 1152    01/13/21 1132   --   52   --   98/57   --   --    01/13/21 1112   --   64   --   101/52   --   --    01/13/21 0942   --   58   --   108/63   --   --    01/13/21 0842   --   62   --   103/56   --   --    01/13/21 0835   --   --   --   --   --   73.7 (162.4)    01/13/21 0756   --   84    --   --   98   --    01/13/21 0754   98.1 (36.7)   85   16   106/63   98   --            FHR (since admission)     Date/Time Fetal HR Assessment Method Fetal HR (beats/min) Fetal Heart Baseline Rate Fetal HR Variability Fetal HR Accelerations Fetal HR Decelerations Fetal HR Tracing Category    01/14/21 0232  --  --  indeterminate  --  --  --  --    01/14/21 0230  --  120  normal range  moderate (amplitude range 6 to 25 bpm)  absent  variable  --    01/14/21 0200  --  115  normal range  moderate (amplitude range 6 to 25 bpm)  greater than/equal to 15 bpm;lasting at least 15 seconds  early;variable  --    01/14/21 0130  --  135  normal range  moderate (amplitude range 6 to 25 bpm)  greater than/equal to 15 bpm;lasting at least 15 seconds  --  --    01/14/21 0100  --  125  normal range  moderate (amplitude range 6 to 25 bpm)  greater than/equal to 15 bpm;lasting at least 15 seconds  absent  --    01/14/21 0030  --  120  normal range  moderate (amplitude range 6 to 25 bpm)  greater than/equal to 15 bpm;lasting at least 15 seconds  absent  --    01/14/21 0000  --  110  normal range  moderate (amplitude range 6 to 25 bpm)  greater than/equal to 15 bpm;lasting at least 15 seconds  early  --    01/13/21 2330  --  120  normal range  moderate (amplitude range 6 to 25 bpm)  greater than/equal to 15 bpm;lasting at least 15 seconds  absent  --    01/13/21 2300  --  125  normal range  moderate (amplitude range 6 to 25 bpm)  greater than/equal to 15 bpm;lasting at least 15 seconds  absent  --    01/13/21 2230  --  130  normal range  moderate (amplitude range 6 to 25 bpm)  greater than/equal to 15 bpm;lasting at least 15 seconds  absent  --    01/13/21 2200  --  130  normal range  moderate (amplitude range 6 to 25 bpm)  greater than/equal to 15 bpm;lasting at least 15 seconds  absent  --    01/13/21 2130  --  120  normal range  moderate (amplitude range 6 to 25 bpm)  greater than/equal to 15 bpm;lasting at least 15 seconds  absent   --    01/13/21 2100  --  120  normal range  moderate (amplitude range 6 to 25 bpm)  greater than/equal to 15 bpm;lasting at least 15 seconds  variable  --    01/13/21 2030  --  115  normal range  moderate (amplitude range 6 to 25 bpm)  greater than/equal to 15 bpm;lasting at least 15 seconds  absent  --    01/13/21 2000  --  115  normal range  moderate (amplitude range 6 to 25 bpm)  greater than/equal to 15 bpm;lasting at least 15 seconds  absent  --    01/13/21 1930  external  115  normal range  moderate (amplitude range 6 to 25 bpm)  greater than/equal to 15 bpm;lasting at least 15 seconds  variable RN at bs, audible variable decelerations noted  --    01/13/21 1900  external  120  normal range  moderate (amplitude range 6 to 25 bpm)  greater than/equal to 15 bpm;lasting at least 15 seconds  absent  --    01/13/21 1830  external  120  normal range  moderate (amplitude range 6 to 25 bpm)  greater than/equal to 15 bpm;lasting at least 15 seconds  absent  --    01/13/21 1800  external  120  normal range  moderate (amplitude range 6 to 25 bpm)  greater than/equal to 15 bpm;lasting at least 15 seconds  absent  --    01/13/21 1730  external  120  normal range  moderate (amplitude range 6 to 25 bpm)  greater than/equal to 15 bpm;lasting at least 15 seconds  absent  --    01/13/21 1700  external  120  normal range  minimal (detectable, amplitude less than or equal to 5 bpm)  lasting at least 15 seconds;greater than/equal to 15 bpm  absent  --    01/13/21 1630  external  120  normal range  moderate (amplitude range 6 to 25 bpm)  greater than/equal to 15 bpm;lasting at least 15 seconds  absent  --    01/13/21 1600  external  120  normal range  moderate (amplitude range 6 to 25 bpm)  greater than/equal to 15 bpm;lasting at least 15 seconds  absent  --    01/13/21 1530  external  115  normal range  moderate (amplitude range 6 to 25 bpm)  greater than/equal to 15 bpm;lasting at least 15 seconds  absent  --    01/13/21 1500   external  115  normal range  moderate (amplitude range 6 to 25 bpm)  greater than/equal to 15 bpm;lasting at least 15 seconds  absent  --    01/13/21 1430  external  120  normal range  moderate (amplitude range 6 to 25 bpm)  greater than/equal to 15 bpm;lasting at least 15 seconds  absent  --    01/13/21 1400  external  115  normal range  moderate (amplitude range 6 to 25 bpm)  greater than/equal to 15 bpm;lasting at least 15 seconds  absent  --    01/13/21 1330  external  120  normal range  moderate (amplitude range 6 to 25 bpm)  greater than/equal to 15 bpm;lasting at least 15 seconds  absent  --    01/13/21 1300  external  115  normal range  moderate (amplitude range 6 to 25 bpm)  greater than/equal to 15 bpm;lasting at least 15 seconds  absent  --    01/13/21 1230  external  105  normal range  moderate (amplitude range 6 to 25 bpm)  greater than/equal to 15 bpm;lasting at least 15 seconds  absent  --    01/13/21 1200  external  110  normal range  moderate (amplitude range 6 to 25 bpm)  greater than/equal to 15 bpm;lasting at least 15 seconds  absent  --    01/13/21 1130  external  115  normal range  moderate (amplitude range 6 to 25 bpm)  greater than/equal to 15 bpm;lasting at least 15 seconds  absent  --    01/13/21 1100  external  110  normal range  moderate (amplitude range 6 to 25 bpm)  greater than/equal to 15 bpm;lasting at least 15 seconds  absent  --    01/13/21 1030  external  110  normal range  moderate (amplitude range 6 to 25 bpm)  greater than/equal to 15 bpm;lasting at least 15 seconds  absent  --    01/13/21 1000  external  115  normal range  moderate (amplitude range 6 to 25 bpm)  greater than/equal to 15 bpm;lasting at least 15 seconds  absent  --    01/13/21 0930  external  115  normal range  moderate (amplitude range 6 to 25 bpm)  greater than/equal to 15 bpm;lasting at least 15 seconds  absent  --    01/13/21 0900  external  115  normal range  moderate (amplitude range 6 to 25 bpm)  greater  than/equal to 15 bpm;lasting at least 15 seconds  absent  --    01/13/21 0830  external  115  normal range  moderate (amplitude range 6 to 25 bpm)  greater than/equal to 15 bpm;lasting at least 15 seconds  absent  --    01/13/21 0758  external  --  --  --  --  --  --        Uterine Activity (since admission)     Date/Time Method Contraction Frequency (Minutes) Contraction Duration (sec) Contraction Intensity Uterine Resting Tone Contraction Pattern    01/14/21 0232  --  x2  60-90  strong by palpation  soft by palpation  --    01/14/21 0230  --  2-3  60-90  strong by palpation  soft by palpation  --    01/14/21 0200  --  2-3  60-80  moderate by palpation  soft by palpation  --    01/14/21 0130  --  2-3  60-80  moderate by palpation  soft by palpation  --    01/14/21 0100  --  2-3.5  60-80  moderate by palpation  soft by palpation  --    01/14/21 0030  --  1.5-3.5  60-80  moderate by palpation  soft by palpation  --    01/14/21 0000  --  1.5-3  60-80  moderate by palpation  soft by palpation  --    01/13/21 2330  --  2-3  60-80  moderate by palpation  soft by palpation  --    01/13/21 2300  --  2-3  60-80  moderate by palpation  soft by palpation  --    01/13/21 2230  --  2-4  60-90  moderate by palpation  soft by palpation  --    01/13/21 2200  --  2-3  60-80  moderate by palpation  soft by palpation  --    01/13/21 2130  --  2-3  60-80  moderate by palpation  soft by palpation  --    01/13/21 2100  --  2-3  60-80  moderate by palpation  soft by palpation  --    01/13/21 2030  --  2-4  50-70  moderate by palpation  soft by palpation  --    01/13/21 2000  --  2-4  50-80  moderate by palpation  soft by palpation  --    01/13/21 1930  IUPC (intrauterine pressure catheter);palpation  2-3  50-90  moderate by palpation  soft by palpation  --    01/13/21 1900  palpation;IUPC (intrauterine pressure catheter)  2-3  50-90  moderate by palpation  soft by palpation  --    01/13/21 4910  palpation;IUPC (intrauterine pressure  catheter)  2-3  60-80  moderate by palpation  soft by palpation  --    01/13/21 1800  palpation;external tocotransducer  2-3  50-60  mild by palpation  soft by palpation  --    01/13/21 1730  palpation;external tocotransducer  2-3  50-90  mild by palpation  soft by palpation  --    01/13/21 1700  palpation;external tocotransducer  2.5-3.5  50-90  mild by palpation  soft by palpation  --    01/13/21 1630  palpation;external tocotransducer  3-4  50-80  mild by palpation  soft by palpation  --    01/13/21 1600  palpation;external tocotransducer  2-3    mild by palpation  soft by palpation  --    01/13/21 1530  palpation;external tocotransducer  2-3    mild by palpation  soft by palpation  --    01/13/21 1500  palpation;external tocotransducer  2-5    mild by palpation  soft by palpation  --    01/13/21 1430  palpation;external tocotransducer  2-4    mild by palpation  soft by palpation  --    01/13/21 1400  palpation;external tocotransducer  2-3  50-90  mild by palpation  soft by palpation  --    01/13/21 1330  palpation;external tocotransducer  2-4  50-90  mild by palpation  soft by palpation  --    01/13/21 1300  palpation;external tocotransducer  2-5    mild by palpation  soft by palpation  --    01/13/21 1230  palpation;external tocotransducer  2-4  50-80  mild by palpation  soft by palpation  --    01/13/21 1200  palpation;external tocotransducer  2-4  50-90  mild by palpation  soft by palpation  --    01/13/21 1130  palpation;external tocotransducer  2-5    mild by palpation  soft by palpation  --    01/13/21 1100  external tocotransducer;per patient report  --  --  no contractions  soft by palpation  --    01/13/21 1030  external tocotransducer;per patient report;palpation  --  --  no contractions  soft by palpation  --    01/13/21 1000  external tocotransducer;per patient report  --  --  no contractions  soft by palpation  --    01/13/21 0930  external tocotransducer;per  patient report  --  --  no contractions  soft by palpation  --    01/13/21 0900  external tocotransducer;per patient report  --  --  no contractions  soft by palpation  --    01/13/21 0830  external tocotransducer;per patient report  --  --  no contractions  soft by palpation  --    01/13/21 0758  external tocotransducer;per patient report;palpation  --  --  --  soft by palpation  --        Labor Pain (since admission)     Date/Time (0-10) Pain Rating: Rest (0-10) Pain Rating: Activity Pain Management Interventions    01/14/21 1301  4  5  --    01/14/21 1230  2  2  see MAR    01/14/21 1010  0  --  --    01/14/21 0906  4  6  see MAR    01/14/21 0455  --  2  pain management plan reviewed with patient/caregiver    01/14/21 0430  --  4  --    01/14/21 0415  --  5  --    01/14/21 0400  --  5  --    01/14/21 0345  --  3  --    01/14/21 0300  0  0  --    01/14/21 0245  0  0  --    01/13/21 2200  2  --  pain pump in use;pain management plan reviewed with patient/caregiver    01/13/21 1956  0  0  --    01/13/21 1918  --  2  --    01/13/21 1900  --  --  --    01/13/21 1800  6  6  pain management plan reviewed with patient/caregiver    01/13/21 1601  3  3  no interventions per patient request;pain management plan reviewed with patient/caregiver    01/13/21 1201  2  2  no interventions per patient request;pain management plan reviewed with patient/caregiver    01/13/21 1112  2  2  no interventions per patient request;pain management plan reviewed with patient/caregiver    01/13/21 0930  0  0  --

## 2021-01-14 NOTE — PROGRESS NOTES
"Discharge Planning Assessment  [facility]     Patient Name: Glenna Miller  MRN: [id number]  Today's Date: 2021    Admit Date: 2021    Discharge Needs Assessment    No documentation.       Discharge Plan     Row Name 21 1727       Plan    Plan  Infant to discharge home with mother when medically ready. LCSW will follow for cord toxicology results. SPIKE SharpeW    Plan Comments  Mother: Glenna Miller, ;[id number] Infant: Karuna Sinha,  [id number]. LCSW was consulted for \" .Potential  Drug Exposure\" LCSW spoke with RN ,Capri who had questioned making a CPS report on mom due to past drug use. Of note, both mother and infant had a negative urine toxicology screen on admission. No prenatal urine toxicology screens available in EPIC. Cord toxicology was sent, LCSW will follow for cord toxicology results and will report to CPS if warranted. LCSW met with mother alone at bedside. Mother holding and interacting appropriately with infant during discussion. Mother verified address, phone and insurance. Mother reports she has spoken with [employer] about adding infant to coverage. Mother reports she has car seat, crib, bassinette, clothes, diapers and other supplies for infant. Mother is current with [facility] and has already called to schedule [facility] appointment. Mother reports a good support system in her boyfriend. Mother plans on infant follow up with  [facility]  and feels comfortable scheduling appointments and will have transportation to appointments. Mother denies any violence, threats, or feeling unsafe. Mother denies any needs or concerns. LCSW educated mother about cord toxicology being sent and if it is positive a CPS report would be made at that time. Mother denies any current substance use and shared she is prescribed Subutex and in a medication assisted program. Mother shared she attends [facility] and receives group and individual " therapy there as she in recovery. Mother shared it was a 1 year ago where she had a relapse. Mother polite and cooperative with LCSW during discussion. Mother denied any other needs or concerns. LCSW provided mother with resources packet and briefly discussed resources in packet. Packet includes info on WIC, HANDS, infant supplies, domestic violence, transportation, counseling, and general community resources. LCSW will follow for cord toxicology results. Cole Moses LCSW    Final Discharge Disposition Code  01 - home or self-care        Continued Care and Services - Admitted Since 1/13/2021    Coordination has not been started for this encounter.         Demographic Summary     Row Name 01/14/21 1727       General Information    Admission Type  inpatient    Arrived From  home    Referral Source  nursing    Reason for Consult  substance use concerns        Functional Status    No documentation.       Psychosocial     Row Name 01/14/21 1727       Behavior WDL    Behavior WDL  WDL       Emotion Mood WDL    Emotion/Mood/Affect WDL  WDL       Speech WDL    Speech WDL  WDL       Perceptual State WDL    Perceptual State WDL  WDL       Thought Process WDL    Thought Process WDL  WDL       Intellectual Performance WDL    Intellectual Performance WDL  WDL        Abuse/Neglect     Row Name 01/14/21 1727       Personal Safety    Feels Unsafe at Home or Work/School  no    Feels Threatened by Someone  no    Does Anyone Try to Keep You From Having Contact with Others or Doing Things Outside Your Home?  no    Physical Signs of Abuse Present  no        Legal    No documentation.       Substance Abuse    No documentation.       Patient Forms    No documentation.           Cole Moses

## 2021-01-14 NOTE — LACTATION NOTE
Gave personal pump    Lactation Consult Note    Evaluation Completed: 2021 13:49 EST  Patient Name: Glenna Miller  :  1995  MRN:  [id number]     REFERRAL  INFORMATION:                                         DELIVERY HISTORY:        Skin to skin initiation date/time: 2021  2:33 AM   Skin to skin end date/time: 2021  4:00 AM        MATERNAL ASSESSMENT:                               INFANT ASSESSMENT:  Information for the patient's :  Star MillerNathalia [[id number]]   No past medical history on file.                                                                                                     MATERNAL INFANT FEEDING:                                                                       EQUIPMENT TYPE:                                 BREAST PUMPING:          LACTATION REFERRALS:

## 2021-01-14 NOTE — L&D DELIVERY NOTE
[facility]  Vaginal Delivery Note    Delivery    Predelivery Diagnoses: 1) Pregnancy at 39w4d                                          2) Abnormal MSAFP-4                                          3) Buprenorphine use in pregnancy                                           4) Hepatitis C in pregnancy                    Postdelivery Diagnoses 1) Pregnancy at 39w4d                                            2) Abnormal MSAFP-4                                          3) Buprenorphine use in pregnancy                                           4) Hepatitis C in pregnancy    Attending :  Edu Baltazar MD     Procedure : Obstetrical controlled vaginal delivery    Delivery Narrative :     Glenna Miller is a 25 y.o. year old  @ 39w4d estimated gestational age. She presented to L&D for induction of labor.   Her prenatal care was with Dr. Baltazar and was complicated by 1) Abnormal MSAFP-4, risk of Trisomy 21 was 1:230, not able to get NIPT (too little specimen) x 2 - normal growth and BPP weekly, 2) buprenorphine use - stable use in pregnancy aware risks of discontinue and  withdrawal, 3) Hx of Hep C- negative viral count in early pregnancy, normal HIV and RPR in third trimester. Once on L&D her labor progressed well along the labor curve with the aid pitocin, amniotomy with IUPC and epidural interventions.  Once she was to the second stage, I was called for delivery.     Upon my arrival she was prepped in the lithotomy position, and was doing well in her pushing efforts. With delivery of the fetal head in OA presentation, bulb suction was performed, then using a garcia type maneuver, pressure was placed along the posterior aspect of the anterior shoulder and it delivered without difficulty. No nuchal cord noted.  The infant showed good cry and tone and was placed upon the Mother's abdomen.  After a thirty second delay, I then clamped the cord and it was cut by the father of the baby.  Care of the infant  was then turned over to the delivery team. They do not report any major concerning findings for Down syndrome on initial inspection. Cord blood was obtained and then using gentle pressure the placenta was delivered spontaneous/intact and noted to have 3 vessel cord.  At that point I undertook inspection of the perineum and vulva and I repaired second degree perineal laceration and right labial laceration using 3-0 Monocryl in standard fashion with good hemostatic and cosmetic results.       After repair of all lacerations, the area was noted to be hemostatic and all sponge and needle counts were correct. A vaginal exam and rectal exam showed no issues with retained equipment or suture in an abnormal place.      There was one family member(s) noted to be in the room with this patient.            Delivery: Vaginal, Spontaneous     YOB: 2020    Time of Birth: 2:32 AM      Anesthesia: Epidural             EBL: 150 cc           Infant    Findings: female  infant     Infant observations: Weight: No birth weight on file.   Length:   in  Observations/Comments:  Scale 4      Apgars: 8  @ 1 minute /    9  @ 5 minutes       Complications  none    Disposition  Mother to Mother Baby/Postpartum  in stable condition currently.  Baby to remains with mom  in stable condition currently.      Edu Baltazar MD  01/14/21  03:04 EST

## 2021-01-14 NOTE — CONSULTS
1/14/2021 1001: Consult to Infection Prevention nurse received regarding history of Hepatitis C. EPID 394 Reportable Disease form sent to the [facility]. Petty [id number], RN, CIC

## 2021-01-14 NOTE — LACTATION NOTE
This note was copied from a baby's chart.  P2T, hx hep C. Mother reports that she had low supply with first baby so did not end up breastfeeding long. She reports that this baby is latching well q3hrs and she is following up with formula supplementation. Discussed increasing stimulation with HGP at least 3-4 times per day. Set up HGP pump and discussed use/cleaning. Encouraged hand expression if infant having trouble latching/too sleepy to latch. Faxed pump prescription. Mother attempts to latch but infant sleepy, father quickly takes infant and gives bottle. Reinforced the importance of putting baby to the breast and keeping up consistent stimulation every 3 hours. Encouraged continued attempts at latching to keep infant from refusing the breast.  Encouraged mother to call with any needs.

## 2021-01-14 NOTE — PROGRESS NOTES
Postpartum Progress Note      Status post Vaginal Delivery: Doing well postoperatively.     1) postpartum care immediately following delivery :    - Routine care,  2) HCV:   - VL negative  3) Subutex us:   Stable    Rh status: O negative, baby also O negative  Rubella: Immune   Gender: Female    Subjective     Postpartum Day 1: Vaginal delivery    The patient feels well. The patient denies emotional concerns. Pain is well controlled with current medications. The baby is well. The patient is ambulating well. The patient is tolerating a normal diet.     Objective     Vital signs in last 24 hours:  Temp:  [97.8 °F (36.6 °C)-98.5 °F (36.9 °C)] 98.2 °F (36.8 °C)  Heart Rate:  [] 53  Resp:  [16] 16  BP: ()/(46-82) 127/78      General:    alert, appears stated age and cooperative   CV: Well perfused   Lungs: No respiratory distress   Abdomen:  Soft, Non-tender    Lochia:  appropriate   Uterine Fundus:   firm   Ext    Edema trace   DVT Evaluation:  No evidence of DVT seen on physical exam.     Lab Results   Component Value Date    WBC 12.37 (H) 01/13/2021    HGB 11.2 (L) 01/13/2021    HCT 33.2 (L) 01/13/2021    MCV 82.8 01/13/2021     01/13/2021       Angelica Alegria MD  1/14/2021  11:51 EST

## 2021-01-15 VITALS
RESPIRATION RATE: 18 BRPM | WEIGHT: 162.4 LBS | OXYGEN SATURATION: 98 % | DIASTOLIC BLOOD PRESSURE: 76 MMHG | HEART RATE: 80 BPM | TEMPERATURE: 99.4 F | BODY MASS INDEX: 29.88 KG/M2 | HEIGHT: 62 IN | SYSTOLIC BLOOD PRESSURE: 121 MMHG

## 2021-01-15 LAB
BASOPHILS # BLD AUTO: 0.04 10*3/MM3 (ref 0–0.2)
BASOPHILS NFR BLD AUTO: 0.3 % (ref 0–1.5)
DEPRECATED RDW RBC AUTO: 39 FL (ref 37–54)
EOSINOPHIL # BLD AUTO: 0.14 10*3/MM3 (ref 0–0.4)
EOSINOPHIL NFR BLD AUTO: 1 % (ref 0.3–6.2)
ERYTHROCYTE [DISTWIDTH] IN BLOOD BY AUTOMATED COUNT: 12.9 % (ref 12.3–15.4)
HCT VFR BLD AUTO: 31.7 % (ref 34–46.6)
HGB BLD-MCNC: 10.1 G/DL (ref 12–15.9)
IMM GRANULOCYTES # BLD AUTO: 0.09 10*3/MM3 (ref 0–0.05)
IMM GRANULOCYTES NFR BLD AUTO: 0.6 % (ref 0–0.5)
LYMPHOCYTES # BLD AUTO: 1.95 10*3/MM3 (ref 0.7–3.1)
LYMPHOCYTES NFR BLD AUTO: 13.5 % (ref 19.6–45.3)
MCH RBC QN AUTO: 26.4 PG (ref 26.6–33)
MCHC RBC AUTO-ENTMCNC: 31.9 G/DL (ref 31.5–35.7)
MCV RBC AUTO: 83 FL (ref 79–97)
MONOCYTES # BLD AUTO: 0.62 10*3/MM3 (ref 0.1–0.9)
MONOCYTES NFR BLD AUTO: 4.3 % (ref 5–12)
NEUTROPHILS NFR BLD AUTO: 11.56 10*3/MM3 (ref 1.7–7)
NEUTROPHILS NFR BLD AUTO: 80.3 % (ref 42.7–76)
NRBC BLD AUTO-RTO: 0 /100 WBC (ref 0–0.2)
PLATELET # BLD AUTO: 161 10*3/MM3 (ref 140–450)
PMV BLD AUTO: 12.4 FL (ref 6–12)
RBC # BLD AUTO: 3.82 10*6/MM3 (ref 3.77–5.28)
WBC # BLD AUTO: 14.4 10*3/MM3 (ref 3.4–10.8)

## 2021-01-15 PROCEDURE — 85025 COMPLETE CBC W/AUTO DIFF WBC: CPT | Performed by: OBSTETRICS & GYNECOLOGY

## 2021-01-15 PROCEDURE — 0503F POSTPARTUM CARE VISIT: CPT | Performed by: OBSTETRICS & GYNECOLOGY

## 2021-01-15 RX ORDER — IBUPROFEN 800 MG/1
800 TABLET ORAL EVERY 8 HOURS PRN
Qty: 30 TABLET | Refills: 0 | Status: SHIPPED | OUTPATIENT
Start: 2021-01-15 | End: 2023-02-06

## 2021-01-15 RX ORDER — PSEUDOEPHEDRINE HCL 30 MG
100 TABLET ORAL 2 TIMES DAILY
Qty: 20 CAPSULE | Refills: 0 | Status: SHIPPED | OUTPATIENT
Start: 2021-01-15 | End: 2023-02-06

## 2021-01-15 RX ADMIN — BUPRENORPHINE HCL 12 MG: 8 TABLET SUBLINGUAL at 09:14

## 2021-01-15 RX ADMIN — IBUPROFEN 800 MG: 800 TABLET, FILM COATED ORAL at 09:14

## 2021-01-15 RX ADMIN — DOCUSATE SODIUM 100 MG: 100 CAPSULE, LIQUID FILLED ORAL at 09:14

## 2021-01-15 RX ADMIN — Medication 1 TABLET: at 09:14

## 2021-01-15 NOTE — DISCHARGE SUMMARY
Date of Discharge:  1/15/2021    Discharge Diagnosis: 1.  Intrauterine pregnancy at 39-4/7 weeks, delivered  2.  Buprenorphine use in pregnancy  3.  Hepatitis C in pregnancy    Presenting Problem/History of Present Illness  Abnormal AFP3 test [R77.2]       Hospital Course  Patient is a 25 y.o. female presented who presented at 39-4/7 weeks for induction of labor.  A vigorous  was delivered.  For event surrounding delivery, please see the delivery note.  The postpartum course was smooth.  By postpartum day #1, the patient was afebrile, tolerating regular diet and her lochia was minimal.  At this point, the patient requested discharge and I felt that she was stable for this.    Procedures Performed         Consults:   Consults     No orders found from 12/15/2020 to 2021.              Condition on Discharge: Stable    Vital Signs  Temp:  [98.3 °F (36.8 °C)-99.4 °F (37.4 °C)] 99.4 °F (37.4 °C)  Heart Rate:  [54-80] 80  Resp:  [16-18] 18  BP: (106-121)/(67-76) 121/76    Physical Exam:   The abdomen is soft and nondistended.  Fundus is firm and nontender.  Extremities are equal in size with minimal pedal edema    Discharge Disposition  Home or Self Care    Discharge Medications     Discharge Medications      New Medications      Instructions Start Date   docusate sodium 100 MG capsule   100 mg, Oral, 2 Times Daily      ibuprofen 800 MG tablet  Commonly known as: ADVIL,MOTRIN   800 mg, Oral, Every 8 Hours PRN         Continue These Medications      Instructions Start Date   buprenorphine 8 MG sublingual tablet SL tablet  Commonly known as: SUBUTEX   DISSOLVE 1 AND A HALF TS UNDER THE TONGUE QD      prenatal vitamin 27-0.8 27-0.8 MG tablet tablet   1 tablet, Oral, Daily         Stop These Medications    calcium carbonate 500 MG chewable tablet  Commonly known as: TUMS     ferrous sulfate 325 (65 FE) MG tablet            Discharge Diet:     Activity at Discharge:     Follow-up Appointments  No future  appointments.  Additional Instructions for the Follow-ups that You Need to Schedule     Call MD With Problems / Concerns   As directed      Instructions: Call for fever, severe abdominal pain, heavy bleeding or any other concerns    Order Comments: Instructions: Call for fever, severe abdominal pain, heavy bleeding or any other concerns          Discharge Follow-up with Specified Provider: Dr Baltazar; 6 Weeks   As directed      To: Dr Baltazra    Follow Up: 6 Weeks               Test Results Pending at Discharge  Pending Labs     Order Current Status    Buprenorphine Screen Urine - Urine, Clean Catch In process    URINE DRUG SCREEN PLUS BUPRENORPHINE - In process           Lázaro Joshua MD  01/15/21  10:25 EST    Time: Discharge 25 min

## 2021-01-15 NOTE — PAYOR COMM NOTE
"[facility]  [address] [place] [address]  Facility NPI: [id number]  Aline Loredo  Fax: [phone]  Phone: [phone] (Corina: 7480, Yaquelin: 5259)  Email: [email or web address]    Date: 01/15/2021  To: [facility]   Fax: [phone]  Subject: D/C SUMMARY   Reference #: [id number]    Please don't hesitate to contact me with any questions or concerns.      Scout Miller (25 y.o. Female)     Date of Birth Social Security Number Address Home Phone MRN    1995  [address] [place], [place] [id number]  [id number]    Bahai Marital Status          Unknown Single       Admission Date Admission Type Admitting Provider Attending Provider Department, Room/Bed    1/13/21 Elective Edu Baltazar MD  [facility], E365/1    Discharge Date Discharge Disposition Discharge Destination        1/15/2021 Home or Self Care              Attending Provider: (none)   Allergies: No Known Allergies    Isolation: None   Infection: None   Code Status: CPR    Ht: 157.5 cm (62\")   Wt: 73.7 kg (162 lb 6.4 oz)    Admission Cmt: None   Principal Problem: Normal vaginal delivery [O80]                 Active Insurance as of 1/13/2021     Primary Coverage     Payor Plan Insurance Group Employer/Plan Group    [facility] MEDICAID AXY     Payor Plan Address Payor Plan Phone Number Payor Plan Fax Number Effective Dates    [address] [address] [phone]  6/22/2020 - None Entered    [facility] [id number]       Subscriber Name Subscriber Birth Date Member ID       SCOUT MILLER [id number]                 Emergency Contacts      (Rel.) Home Phone Work Phone Mobile Phone    LONG WHITMORE (Significant Other) [phone] -- --               Discharge Summary      Lázaro Joshua MD at 01/15/21 1025              Date of Discharge:  1/15/2021    Discharge Diagnosis: 1.  Intrauterine pregnancy at 39-4/7 weeks, delivered  2.  Buprenorphine use in pregnancy  3.  " Hepatitis C in pregnancy    Presenting Problem/History of Present Illness  Abnormal AFP3 test [R77.2]       Hospital Course  Patient is a 25 y.o. female presented who presented at 39-4/7 weeks for induction of labor.  A vigorous  was delivered.  For event surrounding delivery, please see the delivery note.  The postpartum course was smooth.  By postpartum day #1, the patient was afebrile, tolerating regular diet and her lochia was minimal.  At this point, the patient requested discharge and I felt that she was stable for this.    Procedures Performed         Consults:   Consults     No orders found from 12/15/2020 to 2021.              Condition on Discharge: Stable    Vital Signs  Temp:  [98.3 °F (36.8 °C)-99.4 °F (37.4 °C)] 99.4 °F (37.4 °C)  Heart Rate:  [54-80] 80  Resp:  [16-18] 18  BP: (106-121)/(67-76) 121/76    Physical Exam:   The abdomen is soft and nondistended.  Fundus is firm and nontender.  Extremities are equal in size with minimal pedal edema    Discharge Disposition  Home or Self Care    Discharge Medications     Discharge Medications      New Medications      Instructions Start Date   docusate sodium 100 MG capsule   100 mg, Oral, 2 Times Daily      ibuprofen 800 MG tablet  Commonly known as: ADVIL,MOTRIN   800 mg, Oral, Every 8 Hours PRN         Continue These Medications      Instructions Start Date   buprenorphine 8 MG sublingual tablet SL tablet  Commonly known as: SUBUTEX   DISSOLVE 1 AND A HALF TS UNDER THE TONGUE QD      prenatal vitamin 27-0.8 27-0.8 MG tablet tablet   1 tablet, Oral, Daily         Stop These Medications    calcium carbonate 500 MG chewable tablet  Commonly known as: TUMS     ferrous sulfate 325 (65 FE) MG tablet            Discharge Diet:     Activity at Discharge:     Follow-up Appointments  No future appointments.  Additional Instructions for the Follow-ups that You Need to Schedule     Call MD With Problems / Concerns   As directed      Instructions: Call  for fever, severe abdominal pain, heavy bleeding or any other concerns    Order Comments: Instructions: Call for fever, severe abdominal pain, heavy bleeding or any other concerns          Discharge Follow-up with Specified Provider: Dr Markham; 6 Weeks   As directed      To: Dr Markham    Follow Up: 6 Weeks               Test Results Pending at Discharge  Pending Labs     Order Current Status    Buprenorphine Screen Urine - Urine, Clean Catch In process    URINE DRUG SCREEN PLUS BUPRENORPHINE - In process           Lázaro Joshua MD  01/15/21  10:25 EST    Time: Discharge 25 min          Electronically signed by Lázaro Joshua MD at 01/15/21 1027       Discharge Order (From admission, onward)     Start     Ordered    01/15/21 1024  Discharge patient  Once     Expected Discharge Date: 01/15/21    Discharge Disposition: Home or Self Care    Physician of Record for Attribution - Please select from Treatment Team: BENY MARKHAM [[id number]]    Review needed by CMO to determine Physician of Record: No       Question Answer Comment   Physician of Record for Attribution - Please select from Treatment Team BENY MARKHAM    Review needed by CMO to determine Physician of Record No        01/15/21 1029

## 2021-01-15 NOTE — LACTATION NOTE
This note was copied from a baby's chart.  Mom has HGP in room. She is getting some drops. She reports not pumping much or breast feeding with her first baby and that she never had any milk. Encouraged pumping every 3 hrs around the clock and hydration and call for any questions. She has personal pump. Baby getting formula.

## 2021-01-15 NOTE — PROGRESS NOTES
"Continued Stay Note  [facility]     Patient Name: Glenna Miller  MRN: [id number]  Today's Date: 1/15/2021    Admit Date: 1/13/2021    Discharge Plan     Row Name 01/15/21 1523       Plan    Plan  Infant to discharge home with mother when medically ready. LCSW will follow for cord toxicology results. Cole Moses LCSW    Plan Comments  Mother: Glenna Miller, ;[id number] Infant: Karuna Sinha,  [id number]. LCSW was consulted for \"\"FOB issues tonight 1/15, please read nurses note. LCSW spoke with RN Serena who did not express any other concerns. LCSW attempted to meet with mom but she was discharged. LCSW called mother by phone to access her needs and safety. Mother shared she did feel sad about her daughter still being inpatient. Mother shared she did not feel unsafe, threatened or if someone was trying to harm or hurt her. Mother will continue to check in with family during the duration of infants stay. Cole Moses LCSW    Final Discharge Disposition Code  01 - home or self-care        Discharge Codes    No documentation.       Expected Discharge Date and Time     Expected Discharge Date Expected Discharge Time    Josue 15, 2021             Cole Moses    "

## 2021-01-17 LAB — BUPRENORPHINE UR QL: NORMAL NG/ML

## 2021-01-19 NOTE — PROGRESS NOTES
Continued Stay Note  [facility]     Patient Name: Glenna Miller  MRN: [id number]  Today's Date: 1/19/2021    Admit Date: 1/13/2021    Discharge Plan     Row Name 01/19/21 1114       Plan    Plan  Infant to discharge home with mother when medically ready unless CPS determines otherwise. JACY Méndez    Plan Comments  Mother: Glenna Miller, ;[phone] Infant: Karuna Miller,  [phone]. Infant has moved to NICU for treatment for CARINA. Infant buprenorphine urine toxicology screen is back and is positive for buprenorphine. Based on both developments, a CPS report was made via web reporting. Web ID #[id number].  CSW noted in report that mother is in a treatment program at [facility].  CSW asked SURJIT Lance, who is caring for infant in NICU to notify CSW when mother comes in to visit, so that CSW can disclose CPS report being made. Social workers will continue to follow for cord toxicology results and will report those results to CPS if warranted. CSW will continue to follow to assist with psychosocial situation. JACY Méndez        Discharge Codes    No documentation.       Expected Discharge Date and Time     Expected Discharge Date Expected Discharge Time    Josue 15, 2021             MAC Méndez

## 2021-01-20 NOTE — PROGRESS NOTES
Continued Stay Note  [facility]     Patient Name: Glenna Miller  MRN: [id number]  Today's Date: 1/20/2021    Admit Date: 1/13/2021    Discharge Plan     Row Name 01/20/21 1354       Plan    Plan  Infant to discharge home with mother when medically ready, unless CPS accepts the case and determines otherwise. JACY Méndez    Plan Comments Mother: Glenna Miller, ;[phone] Infant: Karuna “Miroslava Miller,  [phone]. CSW reviewed infant cord toxicology results, which are positive for Norbuprenorphine which has been lab confirmed. Due to positive cord toxicology results, a CPS report was made via web reporting web ID #[id number]. CSW will follow up with CPS to see if report was accepted for investigation. CSW noted in report that mother is in treatment program with [facility]. CSW met with mother at bedside in NICU and disclosed results of infant cord toxicology results and that CPS report was made. Mother expressed understanding and was appropriate and excited to visit with her infant. CSW will continue to follow to assist with needs as infant is in the NICU. JACY Méndez          Discharge Codes    No documentation.       Expected Discharge Date and Time     Expected Discharge Date Expected Discharge Time    Josue 15, 2021             MAC Méndez

## 2021-01-20 NOTE — PROGRESS NOTES
Continued Stay Note  [facility]     Patient Name: Glenna Miller  MRN: [id number]  Today's Date: 1/20/2021    Admit Date: 1/13/2021    Discharge Plan     Row Name 01/20/21 1508       Plan    Plan  Infant to discharge home with mother when medically ready.  JACY Méndez    Plan Comments  Mother: Glenna Miller, ;[phone] Infant: Karuna “She” Paul,  [phone]. CSW received an email from CPS  Monica FERRIS, with intake with [facility]. In email CPS worker Monica advised that report # [id number] was NOT accepted for investigation. CSW will continue to follow to assist with psychosocial needs as infant is in the NICU. JACY Méndez    Row Name 01/20/21 1354       Plan    Plan  Infant to discharge home with mother when medically ready, unless CPS accepts the case and determines otherwise. JACY Méndez    Plan Comments  Mother: Glenna Miller, ;[phone] Infant: Karuna “She” Paul,  [phone]. CSW reviewed infant cord toxicology results, which are positive for Norbuprenorphine which has been lab confirmed. Due to positive cord toxicology results, a CPS report was made via web reporting web ID #[id number]. CSW will follow up with CPS to see if report was accepted for investigation. CSW noted in report that mother is in treatment program with [facility]. CSW met with mother at bedside in NICU and disclosed results of infant cord toxicology results and that CPS report was made. Mother expressed understanding and was appropriate and excited to visit with her infant. CSW will continue to follow to assist with needs as infant is in the NICU. JACY Méndez        Discharge Codes    No documentation.       Expected Discharge Date and Time     Expected Discharge Date Expected Discharge Time    Josue 15, 2021             MAC Méndez

## 2021-01-20 NOTE — PROGRESS NOTES
Continued Stay Note  [facility]     Patient Name: Glenna Miller  MRN: [id number]  Today's Date: 1/20/2021    Admit Date: 1/13/2021    Discharge Plan     Row Name 01/20/21 1054       Plan    Plan  Infant to discharge home with mother when medically ready. CSW will follow for cord toxicology results. JACY Méndez    Plan Comments  Mother: Glenna Miller, ;[id number] Infant: Karuna “Miroslava Miller,  [phone]. CSW emailed intake for CPS/CHFS/DCBS [facility] to inquire if report #[id number] was accepted for investigation. CSW received an email back from  Sue SCHWARTZ, that the report was not accepted for investigation. CSW will continue to follow to assist with any needs or concerns as infant is in the NICU. CSW will also follow for cord toxicology results and will report those results to CPS if warranted. JACY Méndez        Discharge Codes    No documentation.       Expected Discharge Date and Time     Expected Discharge Date Expected Discharge Time    Josue 15, 2021             MAC Méndez

## 2021-02-26 ENCOUNTER — TELEPHONE (OUTPATIENT)
Dept: OBSTETRICS AND GYNECOLOGY | Facility: CLINIC | Age: 26
End: 2021-02-26

## 2021-03-12 ENCOUNTER — TELEPHONE (OUTPATIENT)
Dept: OBSTETRICS AND GYNECOLOGY | Facility: CLINIC | Age: 26
End: 2021-03-12

## 2021-04-16 ENCOUNTER — TELEPHONE (OUTPATIENT)
Dept: OBSTETRICS AND GYNECOLOGY | Facility: CLINIC | Age: 26
End: 2021-04-16

## 2021-12-02 RX ORDER — IBUPROFEN 800 MG/1
TABLET ORAL
Qty: 30 TABLET | Refills: 0 | OUTPATIENT
Start: 2021-12-02

## 2021-12-02 NOTE — TELEPHONE ENCOUNTER
Med refill. Delivered 1/13/2021 and has been a no show for all appts since then. No PP visit at all.

## 2021-12-22 ENCOUNTER — TELEPHONE (OUTPATIENT)
Dept: OBSTETRICS AND GYNECOLOGY | Facility: CLINIC | Age: 26
End: 2021-12-22

## 2021-12-22 NOTE — TELEPHONE ENCOUNTER
Deb,     Seen [facility] recently with HCG of 6.   Does she need follow up??  Please help arrange.     Thanks,   Dr. Baltazar

## 2023-02-06 ENCOUNTER — TELEPHONE (OUTPATIENT)
Dept: OBSTETRICS AND GYNECOLOGY | Facility: CLINIC | Age: 28
End: 2023-02-06

## 2023-02-06 ENCOUNTER — INITIAL PRENATAL (OUTPATIENT)
Dept: OBSTETRICS AND GYNECOLOGY | Facility: CLINIC | Age: 28
End: 2023-02-06
Payer: COMMERCIAL

## 2023-02-06 VITALS — BODY MASS INDEX: 31.83 KG/M2 | SYSTOLIC BLOOD PRESSURE: 120 MMHG | DIASTOLIC BLOOD PRESSURE: 77 MMHG | WEIGHT: 174 LBS

## 2023-02-06 DIAGNOSIS — O09.33 LATE PRENATAL CARE AFFECTING PREGNANCY IN THIRD TRIMESTER: Primary | ICD-10-CM

## 2023-02-06 DIAGNOSIS — Z36.9 ANTENATAL SCREENING ENCOUNTER: ICD-10-CM

## 2023-02-06 DIAGNOSIS — F11.20: ICD-10-CM

## 2023-02-06 DIAGNOSIS — O98.419 CHRONIC HEPATITIS C COMPLICATING PREGNANCY, ANTEPARTUM: ICD-10-CM

## 2023-02-06 DIAGNOSIS — Z34.93 UNCERTAIN DATES, ANTEPARTUM, THIRD TRIMESTER: ICD-10-CM

## 2023-02-06 DIAGNOSIS — Z11.3 SCREEN FOR STD (SEXUALLY TRANSMITTED DISEASE): ICD-10-CM

## 2023-02-06 DIAGNOSIS — O09.33 LATE PRENATAL CARE AFFECTING PREGNANCY IN THIRD TRIMESTER: ICD-10-CM

## 2023-02-06 DIAGNOSIS — Z36.89 ENCOUNTER FOR FETAL ANATOMIC SURVEY: ICD-10-CM

## 2023-02-06 DIAGNOSIS — Z78.9 VARICELLA VACCINATION STATUS UNKNOWN: ICD-10-CM

## 2023-02-06 DIAGNOSIS — O09.93 HIGH-RISK PREGNANCY IN THIRD TRIMESTER: Primary | ICD-10-CM

## 2023-02-06 DIAGNOSIS — B18.2 CHRONIC HEPATITIS C COMPLICATING PREGNANCY, ANTEPARTUM: ICD-10-CM

## 2023-02-06 DIAGNOSIS — O99.323: ICD-10-CM

## 2023-02-06 DIAGNOSIS — Z12.4 SCREENING FOR CERVICAL CANCER: ICD-10-CM

## 2023-02-06 LAB
B-HCG UR QL: POSITIVE
EXPIRATION DATE: ABNORMAL
GLUCOSE UR STRIP-MCNC: NEGATIVE MG/DL
INTERNAL NEGATIVE CONTROL: NEGATIVE
INTERNAL POSITIVE CONTROL: POSITIVE
Lab: ABNORMAL
PROT UR STRIP-MCNC: ABNORMAL MG/DL

## 2023-02-06 PROCEDURE — 99214 OFFICE O/P EST MOD 30 MIN: CPT | Performed by: OBSTETRICS & GYNECOLOGY

## 2023-02-06 PROCEDURE — 81025 URINE PREGNANCY TEST: CPT | Performed by: OBSTETRICS & GYNECOLOGY

## 2023-02-06 RX ORDER — SWAB
1 SWAB, NON-MEDICATED MISCELLANEOUS DAILY
Qty: 90 EACH | Refills: 3 | Status: SHIPPED | OUTPATIENT
Start: 2023-02-06

## 2023-02-06 NOTE — PROGRESS NOTES
Initial ob visit     CC- Here for care of pregnancy     Glenna Miller is being seen today for her first obstetrical visit.  She is a 27 y.o.    29w3d gestation.     # 1 - Date: 09/29/15, Sex: Male, Weight: 2580 g (5 lb 11 oz), GA: 39w0d, Delivery: Vaginal, Spontaneous, Apgar1: None, Apgar5: None, Living: Living, Birth Comments: None    # 2 - Date: 21, Sex: Female, Weight: 2888 g (6 lb 5.9 oz), GA: 39w4d, Delivery: Vaginal, Spontaneous, Apgar1: 8, Apgar5: 9, Living: Living, Birth Comments: Scale 4    # 3 - Date: None, Sex: None, Weight: None, GA: None, Delivery: None, Apgar1: None, Apgar5: None, Living: None, Birth Comments: None      Current obstetric complaints : none  Duration/severity of complaints:   Initial positive test date : 2 mths ago     Location : @ home    Prior obstetric issues, potential pregnancy concerns: Hep C, Subutex use, unsure dates  Family history of genetic issues (includes FOB): none  Prior infections concerning in pregnancy (Rash, fever in last 2 weeks): none  Varicella Hx -non-immune  Prior testing for Cystic Fibrosis Carrier or Sickle Cell Trait- unknown status   Prepregnancy BMI - Body mass index is 31.83 kg/m².  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 Please use formulary or generic, with DHA ideal, Disp: 90 each, Rfl: 3    No Known Allergies    Social History     Socioeconomic History   • Marital status: Single   Tobacco Use   • Smoking status: Every Day     Packs/day: 2.00     Types: Cigarettes   • Smokeless tobacco: Never   Substance and Sexual Activity    • Alcohol use: Not Currently   • Drug use: Not Currently   • Sexual activity: Yes     Partners: Male       Family History   Problem Relation Age of Onset   • Thyroid disease Mother    • Heart disease Father    • Thyroid disease Maternal Grandmother    • Breast cancer Neg Hx    • Ovarian cancer Neg Hx    • Uterine cancer Neg Hx    • Colon cancer Neg Hx    • Deep vein thrombosis Neg Hx    • Pulmonary embolism Neg Hx        Review of systems     Constitutional : Nausea, fatigue nausea present, fatigue no    : Vaginal bleeding, cramping no bleeding, no cramping   Breast Tenderness : yes   All other systems reviewed and negative        Objective    /77   Wt 78.9 kg (174 lb)   LMP 07/15/2022 (Approximate)   BMI 31.83 kg/m²       General Appearance:    Alert, cooperative, in no acute distress, habitus normal   Head:    Normocephalic, without obvious abnormality, atraumatic   Eyes:            Lids and lashes normal, conjunctivae and sclerae normal, no   icterus, no pallor, corneas clear   Ears:    Ears appear intact with no abnormalities noted       Neck:   No adenopathy, supple, trachea midline, no thyromegaly   Back:     No kyphosis present, no scoliosis present,                       Lungs:     Clear to auscultation,respirations regular, even and     unlabored    Heart:    Regular rhythm and normal rate, normal S1 and S2, no            murmur, no gallop, no rub, no click   Breast Exam:    No masses, No nipple discharge   Abdomen:     Normal bowel sounds, no masses, no organomegaly, soft        non-tender, non-distended, no guarding, no rebound                 tenderness   Genitalia:    Vulva - BUS-WNL, NEFG    Vagina - No discharge, No bleeding    Cervix - No Lesions, closed     Uterus - Consistent with 30 weeks, _+FHTs    Adnexa - No mass, NT    Pelvimetry - clinically adequate, gynecoid pelvis     Extremities:   Moves all extremities well, no edema, no cyanosis, no              redness   Pulses:   Pulses  palpable and equal bilaterally   Skin:   No bleeding, bruising or rash   Lymph nodes:   No palpable adenopathy   Neurologic:   Sensation intact, A&O times 3        Brief Urine Lab Results  (Last result in the past 365 days)      Color   Clarity   Blood   Leuk Est   Nitrite   Protein   CREAT   Urine HCG        23 1411               Positive              Assessment & Plan     Diagnoses and all orders for this visit:    1. High-risk pregnancy in third trimester (Primary)  -     POC Pregnancy, Urine  -     OB Panel With HIV  -     Urine Culture - , Urine, Clean Catch    2. Late prenatal care affecting pregnancy in third trimester  -     US Ob 14 + Weeks Single or First Gestation    3. Buprenorphine maintenance treatment complicating pregnancy, third trimester (HCC)  -     Drug Profile Urine - 9 Drugs - Urine, Clean Catch    4. Encounter for fetal anatomic survey  -     US Ob 14 + Weeks Single or First Gestation    5. Uncertain dates, antepartum, third trimester  -     US Ob 14 + Weeks Single or First Gestation    6. Chronic hepatitis C complicating pregnancy, antepartum ([facility])  -     Comprehensive Metabolic Panel  -     Hepatitis C RNA, Quantitative, PCR (graph)    7. Varicella vaccination status unknown  -     Varicella Zoster Antibody, IgG    8. Screen for STD (sexually transmitted disease)  -     Chlamydia trachomatis, Neisseria gonorrhoeae, Trichomonas vaginalis, PCR - Swab, Vagina    9. Screening for cervical cancer  -     IGP, Rfx Aptima HPV ASCU    10.  screening encounter  -     Gestational Screen 1 Hr ([facility])    Other orders  -     Prenatal 28-0.8 MG tablet; Take 1 tablet by mouth Daily. Please use formulary or generic, with DHA ideal  Dispense: 90 each; Refill: 3        1) Pregnancy at 29w3d   Very uncertain and late care  Needs to catch up on all care  2) Buprenorphine in pregnancy   Currently on 2 3/4 per day   Aware risks of withdrawal, CARINA  3) Hx of Hep C   Last check no residual  disease  Confirm again today          Activity recommendation : 150 minutes/week of moderate intensity aerobic activity unless we limit for bleeding, hypertension or other pregnancy complication   Patient is on Prenatal vitamins  Problem list reviewed and updated.  Reviewed routine prenatal care with the office to include but not limited to   General pregnancy recommendations reviewed including but not limited to not changing cat litter, food restrictions, avoidance of alcohol, tobacco and drugs and saunas/hot tubs.   All questions answered.     Edu Baltazar MD   2/6/2023  14:46 EST

## 2023-02-06 NOTE — TELEPHONE ENCOUNTER
Aleksandra,     Changed EDC to 3/9/23  Needs to proceed with weekly visit with BPP.   Please make sure we have on schedule for next week.     Thanks,   Dr. Baltazar

## 2023-02-07 LAB
ABO GROUP BLD: ABNORMAL
ALBUMIN SERPL-MCNC: 3.4 G/DL (ref 3.9–5)
ALBUMIN/GLOB SERPL: 1.2 {RATIO} (ref 1.2–2.2)
ALP SERPL-CCNC: 237 IU/L (ref 44–121)
ALT SERPL-CCNC: 7 IU/L (ref 0–32)
AST SERPL-CCNC: 11 IU/L (ref 0–40)
BASOPHILS # BLD AUTO: 0 X10E3/UL (ref 0–0.2)
BASOPHILS NFR BLD AUTO: 0 %
BILIRUB SERPL-MCNC: <0.2 MG/DL (ref 0–1.2)
BLD GP AB SCN SERPL QL: NEGATIVE
BUN SERPL-MCNC: 9 MG/DL (ref 6–20)
BUN/CREAT SERPL: 20 (ref 9–23)
CALCIUM SERPL-MCNC: 8.9 MG/DL (ref 8.7–10.2)
CHLORIDE SERPL-SCNC: 105 MMOL/L (ref 96–106)
CO2 SERPL-SCNC: 20 MMOL/L (ref 20–29)
CREAT SERPL-MCNC: 0.45 MG/DL (ref 0.57–1)
EGFRCR SERPLBLD CKD-EPI 2021: 136 ML/MIN/1.73
EOSINOPHIL # BLD AUTO: 0 X10E3/UL (ref 0–0.4)
EOSINOPHIL NFR BLD AUTO: 0 %
ERYTHROCYTE [DISTWIDTH] IN BLOOD BY AUTOMATED COUNT: 13.2 % (ref 11.7–15.4)
GLOBULIN SER CALC-MCNC: 2.8 G/DL (ref 1.5–4.5)
GLUCOSE SERPL-MCNC: 65 MG/DL (ref 70–99)
HBV SURFACE AG SERPL QL IA: NEGATIVE
HCT VFR BLD AUTO: 29.8 % (ref 34–46.6)
HCV AB S/CO SERPL IA: 2.4 S/CO RATIO (ref 0–0.9)
HGB BLD-MCNC: 9.7 G/DL (ref 11.1–15.9)
HIV 1+2 AB+HIV1 P24 AG SERPL QL IA: NON REACTIVE
IMM GRANULOCYTES # BLD AUTO: 0 X10E3/UL (ref 0–0.1)
IMM GRANULOCYTES NFR BLD AUTO: 0 %
LYMPHOCYTES # BLD AUTO: 1.7 X10E3/UL (ref 0.7–3.1)
LYMPHOCYTES NFR BLD AUTO: 14 %
MCH RBC QN AUTO: 26.4 PG (ref 26.6–33)
MCHC RBC AUTO-ENTMCNC: 32.6 G/DL (ref 31.5–35.7)
MCV RBC AUTO: 81 FL (ref 79–97)
MONOCYTES # BLD AUTO: 0.5 X10E3/UL (ref 0.1–0.9)
MONOCYTES NFR BLD AUTO: 4 %
NEUTROPHILS # BLD AUTO: 9.7 X10E3/UL (ref 1.4–7)
NEUTROPHILS NFR BLD AUTO: 82 %
PLATELET # BLD AUTO: 166 X10E3/UL (ref 150–450)
POTASSIUM SERPL-SCNC: 4 MMOL/L (ref 3.5–5.2)
PROT SERPL-MCNC: 6.2 G/DL (ref 6–8.5)
RBC # BLD AUTO: 3.68 X10E6/UL (ref 3.77–5.28)
RH BLD: NEGATIVE
RPR SER QL: NON REACTIVE
RUBV IGG SERPL IA-ACNC: 3.68 INDEX
SODIUM SERPL-SCNC: 138 MMOL/L (ref 134–144)
VZV IGG SER IA-ACNC: <135 INDEX
WBC # BLD AUTO: 11.9 X10E3/UL (ref 3.4–10.8)

## 2023-02-07 RX ORDER — FERROUS SULFATE 325(65) MG
325 TABLET ORAL
Qty: 30 TABLET | Refills: 6 | Status: SHIPPED | OUTPATIENT
Start: 2023-02-07

## 2023-02-08 LAB
BACTERIA UR CULT: NORMAL
BACTERIA UR CULT: NORMAL
C TRACH RRNA SPEC QL NAA+PROBE: NEGATIVE
HCV RNA SERPL NAA+PROBE-ACNC: NORMAL IU/ML
N GONORRHOEA RRNA SPEC QL NAA+PROBE: NEGATIVE
T VAGINALIS RRNA SPEC QL NAA+PROBE: NEGATIVE
TEST INFORMATION: NORMAL

## 2023-02-08 NOTE — PROGRESS NOTES
Aleksandra, consistent with history of exposure to Hep C, but cleared it on her own. No risk of vertical transmission to the baby. Thanks, Dr. Baltazar

## 2023-02-11 LAB
AMPHET+METHAMPHET UR QL: POSITIVE
BARBITURATES UR QL SCN: NEGATIVE NG/ML
BENZODIAZ UR QL: NEGATIVE NG/ML
BZE UR QL: NEGATIVE NG/ML
CANNABINOIDS UR CFM-MCNC: POSITIVE NG/ML
METHADONE UR QL SCN: NEGATIVE NG/ML
OPIATES UR QL: NEGATIVE NG/ML
PCP UR QL: NEGATIVE NG/ML
PROPOXYPH UR QL SCN: NEGATIVE NG/ML

## 2023-02-15 ENCOUNTER — TELEPHONE (OUTPATIENT)
Dept: OBSTETRICS AND GYNECOLOGY | Facility: CLINIC | Age: 28
End: 2023-02-15
Payer: COMMERCIAL

## 2023-02-15 LAB
CONV .: ABNORMAL
CYTOLOGIST CVX/VAG CYTO: ABNORMAL
CYTOLOGY CVX/VAG DOC CYTO: ABNORMAL
CYTOLOGY CVX/VAG DOC THIN PREP: ABNORMAL
DX ICD CODE: ABNORMAL
DX ICD CODE: ABNORMAL
HIV 1 & 2 AB SER-IMP: ABNORMAL
HPV I/H RISK 4 DNA CVX QL PROBE+SIG AMP: POSITIVE
OTHER STN SPEC: ABNORMAL
PATHOLOGIST CVX/VAG CYTO: ABNORMAL
RECOM F/U CVX/VAG CYTO: ABNORMAL
STAT OF ADQ CVX/VAG CYTO-IMP: ABNORMAL

## 2023-02-15 NOTE — TELEPHONE ENCOUNTER
----- Message from Edu Baltazar MD sent at 2/15/2023  9:00 AM EST -----  Lolita, Left message. Needs to be seen this week with BPP! (Late care)- Pap also not normal with ASCUS HPV +, so will need colpo - Can do at postpartum visit. Please reach out to get her set up with us ASAP. Thanks, Dr. Baltazar

## 2023-02-15 NOTE — PROGRESS NOTES
Lolita, Left message. Needs to be seen this week with BPP! (Late care)- Pap also not normal with ASCUS HPV +, so will need colpo - Can do at postpartum visit. Please reach out to get her set up with us ASAP. Thanks, Dr. Baltazar

## 2023-02-17 NOTE — TELEPHONE ENCOUNTER
Third attempt to reach pt to schedule bpp w/ob fu this week.  Pt did not answer or return my phone calls.  Added BPP at 2:40 to appt on 2/22/23.  If she calls back and be seen sooner, that is preferable.

## 2023-02-21 NOTE — PROGRESS NOTES
Aleksandra, voice mail left. Need to follow up on several issues, either call back or keep upcoming appointment. Thanks, Dr. Baltazar

## 2023-02-22 ENCOUNTER — TELEPHONE (OUTPATIENT)
Dept: OBSTETRICS AND GYNECOLOGY | Facility: CLINIC | Age: 28
End: 2023-02-22

## 2023-02-22 ENCOUNTER — ROUTINE PRENATAL (OUTPATIENT)
Dept: OBSTETRICS AND GYNECOLOGY | Facility: CLINIC | Age: 28
End: 2023-02-22
Payer: COMMERCIAL

## 2023-02-22 VITALS — SYSTOLIC BLOOD PRESSURE: 123 MMHG | WEIGHT: 166 LBS | DIASTOLIC BLOOD PRESSURE: 86 MMHG | BODY MASS INDEX: 30.36 KG/M2

## 2023-02-22 DIAGNOSIS — O99.013 ANEMIA DURING PREGNANCY IN THIRD TRIMESTER: ICD-10-CM

## 2023-02-22 DIAGNOSIS — O09.93 HIGH-RISK PREGNANCY IN THIRD TRIMESTER: Primary | ICD-10-CM

## 2023-02-22 DIAGNOSIS — Z67.91 RH NEGATIVE STATUS DURING PREGNANCY IN THIRD TRIMESTER: ICD-10-CM

## 2023-02-22 DIAGNOSIS — O09.33 LATE PRENATAL CARE AFFECTING PREGNANCY IN THIRD TRIMESTER: ICD-10-CM

## 2023-02-22 DIAGNOSIS — O26.893 RH NEGATIVE STATUS DURING PREGNANCY IN THIRD TRIMESTER: ICD-10-CM

## 2023-02-22 DIAGNOSIS — B18.2 CHRONIC HEPATITIS C COMPLICATING PREGNANCY, ANTEPARTUM: ICD-10-CM

## 2023-02-22 DIAGNOSIS — O99.323: ICD-10-CM

## 2023-02-22 DIAGNOSIS — Z36.9 ANTENATAL SCREENING ENCOUNTER: ICD-10-CM

## 2023-02-22 DIAGNOSIS — O98.419 CHRONIC HEPATITIS C COMPLICATING PREGNANCY, ANTEPARTUM: ICD-10-CM

## 2023-02-22 DIAGNOSIS — F11.20: ICD-10-CM

## 2023-02-22 DIAGNOSIS — R87.810 ASCUS WITH POSITIVE HIGH RISK HPV CERVICAL: ICD-10-CM

## 2023-02-22 DIAGNOSIS — R87.610 ASCUS WITH POSITIVE HIGH RISK HPV CERVICAL: ICD-10-CM

## 2023-02-22 LAB
GLUCOSE UR STRIP-MCNC: NEGATIVE MG/DL
PROT UR STRIP-MCNC: ABNORMAL MG/DL

## 2023-02-22 PROCEDURE — 99214 OFFICE O/P EST MOD 30 MIN: CPT | Performed by: OBSTETRICS & GYNECOLOGY

## 2023-02-22 PROCEDURE — 96372 THER/PROPH/DIAG INJ SC/IM: CPT | Performed by: OBSTETRICS & GYNECOLOGY

## 2023-02-22 NOTE — PROGRESS NOTES
Ob follow up    Glenna Miller is a 27 y.o.  37w6d patient being seen today for her obstetrical visit. Patient reports swelling legs and ankles, heartburn. . Fetal movement: normal.    Her prenatal care is complicated by (and status) : late/insufficient care, Buprenorphine in pregnancy, Hep C (cleared), breech, anemia, rh negative and ASCUS HPV +       ROS -   Fetal Movement good   Vaginal bleeding none   Cramping/Contractions none      /86   Wt 75.3 kg (166 lb)   LMP 07/15/2022 (Approximate)   BMI 30.36 kg/m²     FHT:  154 BPM    Uterine Size: 32 cm   Presentations: breech   Pelvic Exam:     Dilation: 1cm    Effacement: 25%    Station:  -2                 Assessment    Diagnoses and all orders for this visit:    1. High-risk pregnancy in third trimester (Primary)    2. Late prenatal care affecting pregnancy in third trimester    3. Buprenorphine maintenance treatment complicating pregnancy, third trimester (HCC)    4. Chronic hepatitis C complicating pregnancy, antepartum (HCC)    5. Anemia during pregnancy in third trimester    6. Maternal care for breech presentation, single gestation    7. ASCUS with positive high risk HPV cervical    8. Rh negative status during pregnancy in third trimester    9.  screening encounter  -     Strep B Screen - Swab, Vaginal/Rectum        1) Pregnancy at 37w6d  2) Fetal status reassuring   3) GBS status - done today  - late - insufficient care  Only second visit - Stressed keeping follow up appt.   Labs reviewed in detail, see below.   - Rh negative, need Rhogam   - Hep C, + Antibody, no virus   Normal LFTs, told in the past had cleared infection   Appears to be accurate   - buprenorphine   BPP today Normal MULU, breech and 8/8  UDS + THC and methamphetamine   Encouraged to stop   - ASCUS HPV +   Late, initial finding   Consider pap +/- colpo at 6 weeks postpartum   - anemia   Moderate at 9.7 grams  Encouraged prenatal and iron rich foods at this point   -  Breech  Check again next week   If still breech would need to consider ECV vs primary      Plan    Labor warnings   [facility] BID        Edu Baltazar MD   2023  16:07 EST

## 2023-02-22 NOTE — TELEPHONE ENCOUNTER
----- Message from Aleksandra Morris MA sent at 2/17/2023 11:02 AM EST -----  L/m for pt, has appt 02/22/2023/linad  ----- Message -----  From: Aleksandra Morris MA  Sent: 2/10/2023   2:43 PM EST  To: Aleksandra Morris MA    L/m for pt/linda  ----- Message -----  From: Aleksandra Morris MA  Sent: 2/8/2023   1:46 PM EST  To: Aleksandra Morris MA    L/m for pt/linda  ----- Message -----  From: Aleksandra Morris MA  Sent: 2/7/2023   4:55 PM EST  To: Aleksandra Morris MA    L/m for pt/linda  ----- Message -----  From: Edu Baltazar MD  Sent: 2/7/2023  12:37 PM EST  To: HOMERO Gudino, Her prenatals are okay, is anemic. Iron sent to help for now. Please let her know. Thanks, Dr. Baltazar

## 2023-02-22 NOTE — TELEPHONE ENCOUNTER
----- Message from Aleksandra Morris MA sent at 2/17/2023 11:02 AM EST -----  L/m for pt- has appt 02/22/2023/linda  ----- Message -----  From: Aleksandra Morris MA  Sent: 2/10/2023   2:43 PM EST  To: Aleksandra Morris MA    L/m for pt/linda  ----- Message -----  From: Aleksandra Morris MA  Sent: 2/9/2023  12:12 PM EST  To: Aleksandra Morris MA    L/m for pt/linda  ----- Message -----  From: Aleksandra Morris MA  Sent: 2/8/2023   1:46 PM EST  To: Aleksandra Morris MA    L/m for pt/linda  ----- Message -----  From: Edu Baltazar MD  Sent: 2/8/2023   1:16 PM EST  To: HOMERO Gudino, consistent with history of exposure to Hep C, but cleared it on her own. No risk of vertical transmission to the baby. Thanks, Dr. Baltazar

## 2023-02-24 LAB — GP B STREP DNA SPEC QL NAA+PROBE: NEGATIVE

## 2023-02-25 ENCOUNTER — ANESTHESIA (OUTPATIENT)
Dept: LABOR AND DELIVERY | Facility: HOSPITAL | Age: 28
End: 2023-02-25
Payer: COMMERCIAL

## 2023-02-25 ENCOUNTER — HOSPITAL ENCOUNTER (INPATIENT)
Facility: HOSPITAL | Age: 28
LOS: 3 days | Discharge: HOME | End: 2023-02-28
Attending: OBSTETRICS & GYNECOLOGY | Admitting: OBSTETRICS & GYNECOLOGY
Payer: COMMERCIAL

## 2023-02-25 ENCOUNTER — ANESTHESIA (OUTPATIENT)
Dept: EMERGENCY DEPT | Facility: HOSPITAL | Age: 28
End: 2023-02-25
Payer: COMMERCIAL

## 2023-02-25 ENCOUNTER — ANESTHESIA EVENT (OUTPATIENT)
Dept: LABOR AND DELIVERY | Facility: HOSPITAL | Age: 28
End: 2023-02-25
Payer: COMMERCIAL

## 2023-02-25 ENCOUNTER — ANESTHESIA EVENT (OUTPATIENT)
Dept: EMERGENCY DEPT | Facility: HOSPITAL | Age: 28
End: 2023-02-25
Payer: COMMERCIAL

## 2023-02-25 DIAGNOSIS — Z98.891 S/P CESAREAN SECTION: Primary | ICD-10-CM

## 2023-02-25 PROBLEM — Z34.90 PREGNANT: Status: ACTIVE | Noted: 2023-02-25

## 2023-02-25 LAB
ABO GROUP BLD: NORMAL
ALBUMIN SERPL-MCNC: 3.3 G/DL (ref 3.5–5.2)
ALBUMIN/GLOB SERPL: 0.8 G/DL
ALP SERPL-CCNC: 323 U/L (ref 39–117)
ALT SERPL W P-5'-P-CCNC: 32 U/L (ref 1–33)
AMPHET+METHAMPHET UR QL: POSITIVE
ANION GAP SERPL CALCULATED.3IONS-SCNC: 12 MMOL/L (ref 5–15)
ANISOCYTOSIS BLD QL: ABNORMAL
AST SERPL-CCNC: 29 U/L (ref 1–32)
BARBITURATES UR QL SCN: NEGATIVE
BENZODIAZ UR QL SCN: NEGATIVE
BILIRUB SERPL-MCNC: 0.2 MG/DL (ref 0–1.2)
BLD GP AB SCN SERPL QL: POSITIVE
BUN SERPL-MCNC: 12 MG/DL (ref 6–20)
BUN/CREAT SERPL: 20.3 (ref 7–25)
CALCIUM SPEC-SCNC: 9.3 MG/DL (ref 8.6–10.5)
CANNABINOIDS SERPL QL: POSITIVE
CHLORIDE SERPL-SCNC: 103 MMOL/L (ref 98–107)
CO2 SERPL-SCNC: 20 MMOL/L (ref 22–29)
COCAINE UR QL: NEGATIVE
CREAT SERPL-MCNC: 0.59 MG/DL (ref 0.57–1)
DEPRECATED RDW RBC AUTO: 39.3 FL (ref 37–54)
EGFRCR SERPLBLD CKD-EPI 2021: 127.7 ML/MIN/1.73
ERYTHROCYTE [DISTWIDTH] IN BLOOD BY AUTOMATED COUNT: 13.5 % (ref 12.3–15.4)
GLOBULIN UR ELPH-MCNC: 3.9 GM/DL
GLUCOSE SERPL-MCNC: 99 MG/DL (ref 65–99)
HCT VFR BLD AUTO: 32.3 % (ref 34–46.6)
HGB BLD-MCNC: 10.6 G/DL (ref 12–15.9)
LYMPHOCYTES # BLD MANUAL: 0.84 10*3/MM3 (ref 0.7–3.1)
LYMPHOCYTES NFR BLD MANUAL: 3.1 % (ref 5–12)
MCH RBC QN AUTO: 25.9 PG (ref 26.6–33)
MCHC RBC AUTO-ENTMCNC: 32.8 G/DL (ref 31.5–35.7)
MCV RBC AUTO: 78.8 FL (ref 79–97)
METHADONE UR QL SCN: NEGATIVE
MICROCYTES BLD QL: ABNORMAL
MONOCYTES # BLD: 0.62 10*3/MM3 (ref 0.1–0.9)
NEUTROPHILS # BLD AUTO: 18.61 10*3/MM3 (ref 1.7–7)
NEUTROPHILS NFR BLD MANUAL: 92.7 % (ref 42.7–76)
NRBC BLD AUTO-RTO: 0.1 /100 WBC (ref 0–0.2)
OPIATES UR QL: NEGATIVE
OXYCODONE UR QL SCN: NEGATIVE
PLAT MORPH BLD: NORMAL
PLATELET # BLD AUTO: 186 10*3/MM3 (ref 140–450)
PMV BLD AUTO: 12.8 FL (ref 6–12)
POTASSIUM SERPL-SCNC: 4 MMOL/L (ref 3.5–5.2)
PROT SERPL-MCNC: 7.2 G/DL (ref 6–8.5)
RBC # BLD AUTO: 4.1 10*6/MM3 (ref 3.77–5.28)
RESIDUAL RHIG DETECTED: NORMAL
RH BLD: NEGATIVE
SODIUM SERPL-SCNC: 135 MMOL/L (ref 136–145)
T&S EXPIRATION DATE: NORMAL
VARIANT LYMPHS NFR BLD MANUAL: 4.2 % (ref 19.6–45.3)
WBC MORPH BLD: NORMAL
WBC NRBC COR # BLD: 20.08 10*3/MM3 (ref 3.4–10.8)

## 2023-02-25 PROCEDURE — 80307 DRUG TEST PRSMV CHEM ANLYZR: CPT | Performed by: OBSTETRICS & GYNECOLOGY

## 2023-02-25 PROCEDURE — 86850 RBC ANTIBODY SCREEN: CPT | Performed by: OBSTETRICS & GYNECOLOGY

## 2023-02-25 PROCEDURE — 59515 CESAREAN DELIVERY: CPT | Performed by: OBSTETRICS & GYNECOLOGY

## 2023-02-25 PROCEDURE — 86870 RBC ANTIBODY IDENTIFICATION: CPT | Performed by: OBSTETRICS & GYNECOLOGY

## 2023-02-25 PROCEDURE — G0480 DRUG TEST DEF 1-7 CLASSES: HCPCS | Performed by: OBSTETRICS & GYNECOLOGY

## 2023-02-25 PROCEDURE — 25010000002 ONDANSETRON PER 1 MG: Performed by: ANESTHESIOLOGY

## 2023-02-25 PROCEDURE — 85007 BL SMEAR W/DIFF WBC COUNT: CPT | Performed by: OBSTETRICS & GYNECOLOGY

## 2023-02-25 PROCEDURE — 25010000002 MORPHINE PER 10 MG: Performed by: ANESTHESIOLOGY

## 2023-02-25 PROCEDURE — 86900 BLOOD TYPING SEROLOGIC ABO: CPT | Performed by: OBSTETRICS & GYNECOLOGY

## 2023-02-25 PROCEDURE — 99202 OFFICE O/P NEW SF 15 MIN: CPT | Performed by: OBSTETRICS & GYNECOLOGY

## 2023-02-25 PROCEDURE — 85025 COMPLETE CBC W/AUTO DIFF WBC: CPT | Performed by: OBSTETRICS & GYNECOLOGY

## 2023-02-25 PROCEDURE — 88307 TISSUE EXAM BY PATHOLOGIST: CPT

## 2023-02-25 PROCEDURE — 86901 BLOOD TYPING SEROLOGIC RH(D): CPT | Performed by: OBSTETRICS & GYNECOLOGY

## 2023-02-25 PROCEDURE — 80053 COMPREHEN METABOLIC PANEL: CPT | Performed by: OBSTETRICS & GYNECOLOGY

## 2023-02-25 PROCEDURE — 25010000002 CEFAZOLIN IN DEXTROSE 2-4 GM/100ML-% SOLUTION

## 2023-02-25 RX ORDER — MISOPROSTOL 200 UG/1
800 TABLET ORAL ONCE AS NEEDED
Status: DISCONTINUED | OUTPATIENT
Start: 2023-02-25 | End: 2023-02-25 | Stop reason: HOSPADM

## 2023-02-25 RX ORDER — SODIUM CHLORIDE, SODIUM LACTATE, POTASSIUM CHLORIDE, CALCIUM CHLORIDE 600; 310; 30; 20 MG/100ML; MG/100ML; MG/100ML; MG/100ML
125 INJECTION, SOLUTION INTRAVENOUS CONTINUOUS
Status: DISCONTINUED | OUTPATIENT
Start: 2023-02-25 | End: 2023-02-26

## 2023-02-25 RX ORDER — MORPHINE SULFATE 1 MG/ML
INJECTION, SOLUTION EPIDURAL; INTRATHECAL; INTRAVENOUS
Status: COMPLETED | OUTPATIENT
Start: 2023-02-25 | End: 2023-02-25

## 2023-02-25 RX ORDER — OXYTOCIN/0.9 % SODIUM CHLORIDE 30/500 ML
250 PLASTIC BAG, INJECTION (ML) INTRAVENOUS CONTINUOUS
Status: DISPENSED | OUTPATIENT
Start: 2023-02-25 | End: 2023-02-25

## 2023-02-25 RX ORDER — SODIUM CHLORIDE 0.9 % (FLUSH) 0.9 %
10 SYRINGE (ML) INJECTION AS NEEDED
Status: DISCONTINUED | OUTPATIENT
Start: 2023-02-25 | End: 2023-02-25 | Stop reason: HOSPADM

## 2023-02-25 RX ORDER — ACETAMINOPHEN 500 MG
1000 TABLET ORAL ONCE
Status: DISCONTINUED | OUTPATIENT
Start: 2023-02-25 | End: 2023-02-25 | Stop reason: SDUPTHER

## 2023-02-25 RX ORDER — CARBOPROST TROMETHAMINE 250 UG/ML
250 INJECTION, SOLUTION INTRAMUSCULAR
Status: DISCONTINUED | OUTPATIENT
Start: 2023-02-25 | End: 2023-02-25 | Stop reason: HOSPADM

## 2023-02-25 RX ORDER — TRISODIUM CITRATE DIHYDRATE AND CITRIC ACID MONOHYDRATE 500; 334 MG/5ML; MG/5ML
30 SOLUTION ORAL ONCE
Status: COMPLETED | OUTPATIENT
Start: 2023-02-25 | End: 2023-02-25

## 2023-02-25 RX ORDER — CEFAZOLIN SODIUM 2 G/100ML
INJECTION, SOLUTION INTRAVENOUS
Status: COMPLETED
Start: 2023-02-25 | End: 2023-02-25

## 2023-02-25 RX ORDER — METHYLERGONOVINE MALEATE 0.2 MG/ML
200 INJECTION INTRAVENOUS ONCE AS NEEDED
Status: DISCONTINUED | OUTPATIENT
Start: 2023-02-25 | End: 2023-02-25 | Stop reason: HOSPADM

## 2023-02-25 RX ORDER — ERYTHROMYCIN 5 MG/G
OINTMENT OPHTHALMIC
Status: ACTIVE
Start: 2023-02-25 | End: 2023-02-26

## 2023-02-25 RX ORDER — TRANEXAMIC ACID 10 MG/ML
1000 INJECTION, SOLUTION INTRAVENOUS ONCE AS NEEDED
Status: DISCONTINUED | OUTPATIENT
Start: 2023-02-25 | End: 2023-02-25 | Stop reason: HOSPADM

## 2023-02-25 RX ORDER — ACETAMINOPHEN 500 MG
1000 TABLET ORAL ONCE
Status: DISCONTINUED | OUTPATIENT
Start: 2023-02-25 | End: 2023-02-25 | Stop reason: HOSPADM

## 2023-02-25 RX ORDER — KETOROLAC TROMETHAMINE 30 MG/ML
30 INJECTION, SOLUTION INTRAMUSCULAR; INTRAVENOUS ONCE
Status: DISCONTINUED | OUTPATIENT
Start: 2023-02-25 | End: 2023-02-25 | Stop reason: HOSPADM

## 2023-02-25 RX ORDER — SODIUM CHLORIDE 0.9 % (FLUSH) 0.9 %
10 SYRINGE (ML) INJECTION EVERY 12 HOURS SCHEDULED
Status: DISCONTINUED | OUTPATIENT
Start: 2023-02-25 | End: 2023-02-25 | Stop reason: HOSPADM

## 2023-02-25 RX ORDER — BUPIVACAINE HYDROCHLORIDE 7.5 MG/ML
INJECTION, SOLUTION EPIDURAL; RETROBULBAR
Status: COMPLETED | OUTPATIENT
Start: 2023-02-25 | End: 2023-02-25

## 2023-02-25 RX ORDER — OXYTOCIN/0.9 % SODIUM CHLORIDE 30/500 ML
999 PLASTIC BAG, INJECTION (ML) INTRAVENOUS ONCE
Status: COMPLETED | OUTPATIENT
Start: 2023-02-25 | End: 2023-02-25

## 2023-02-25 RX ORDER — CEFAZOLIN SODIUM 2 G/100ML
2 INJECTION, SOLUTION INTRAVENOUS ONCE
Status: COMPLETED | OUTPATIENT
Start: 2023-02-25 | End: 2023-02-25

## 2023-02-25 RX ORDER — OXYTOCIN/0.9 % SODIUM CHLORIDE 30/500 ML
125 PLASTIC BAG, INJECTION (ML) INTRAVENOUS CONTINUOUS PRN
Status: COMPLETED | OUTPATIENT
Start: 2023-02-25 | End: 2023-02-25

## 2023-02-25 RX ORDER — ONDANSETRON 2 MG/ML
INJECTION INTRAMUSCULAR; INTRAVENOUS AS NEEDED
Status: DISCONTINUED | OUTPATIENT
Start: 2023-02-25 | End: 2023-02-25 | Stop reason: SURG

## 2023-02-25 RX ORDER — PHYTONADIONE 1 MG/.5ML
INJECTION, EMULSION INTRAMUSCULAR; INTRAVENOUS; SUBCUTANEOUS
Status: ACTIVE
Start: 2023-02-25 | End: 2023-02-26

## 2023-02-25 RX ORDER — ONDANSETRON 2 MG/ML
4 INJECTION INTRAMUSCULAR; INTRAVENOUS ONCE AS NEEDED
Status: COMPLETED | OUTPATIENT
Start: 2023-02-25 | End: 2023-02-25

## 2023-02-25 RX ORDER — FAMOTIDINE 10 MG/ML
20 INJECTION, SOLUTION INTRAVENOUS ONCE AS NEEDED
Status: COMPLETED | OUTPATIENT
Start: 2023-02-25 | End: 2023-02-25

## 2023-02-25 RX ORDER — LIDOCAINE HYDROCHLORIDE 10 MG/ML
5 INJECTION, SOLUTION EPIDURAL; INFILTRATION; INTRACAUDAL; PERINEURAL AS NEEDED
Status: DISCONTINUED | OUTPATIENT
Start: 2023-02-25 | End: 2023-02-25 | Stop reason: HOSPADM

## 2023-02-25 RX ORDER — MORPHINE SULFATE 1 MG/ML
INJECTION, SOLUTION EPIDURAL; INTRATHECAL; INTRAVENOUS AS NEEDED
Status: DISCONTINUED | OUTPATIENT
Start: 2023-02-25 | End: 2023-02-25 | Stop reason: SURG

## 2023-02-25 RX ADMIN — SODIUM CITRATE AND CITRIC ACID MONOHYDRATE 30 ML: 500; 334 SOLUTION ORAL at 20:53

## 2023-02-25 RX ADMIN — MORPHINE SULFATE 2 MG: 1 INJECTION, SOLUTION EPIDURAL; INTRATHECAL; INTRAVENOUS at 21:24

## 2023-02-25 RX ADMIN — Medication 999 ML/HR: at 22:38

## 2023-02-25 RX ADMIN — Medication 999 ML/HR: at 21:15

## 2023-02-25 RX ADMIN — Medication 125 ML/HR: at 23:25

## 2023-02-25 RX ADMIN — CEFAZOLIN SODIUM 2 G: 2 INJECTION, SOLUTION INTRAVENOUS at 20:59

## 2023-02-25 RX ADMIN — ONDANSETRON HYDROCHLORIDE 4 MG: 2 SOLUTION INTRAMUSCULAR; INTRAVENOUS at 21:06

## 2023-02-25 RX ADMIN — MORPHINE SULFATE 2 MG: 1 INJECTION, SOLUTION EPIDURAL; INTRATHECAL; INTRAVENOUS at 21:28

## 2023-02-25 RX ADMIN — MORPHINE SULFATE 2 MG: 1 INJECTION, SOLUTION EPIDURAL; INTRATHECAL; INTRAVENOUS at 21:22

## 2023-02-25 RX ADMIN — MORPHINE SULFATE 200 MCG: 1 INJECTION, SOLUTION EPIDURAL; INTRATHECAL; INTRAVENOUS at 20:23

## 2023-02-25 RX ADMIN — ONDANSETRON 4 MG: 2 INJECTION INTRAMUSCULAR; INTRAVENOUS at 20:52

## 2023-02-25 RX ADMIN — FAMOTIDINE 20 MG: 10 INJECTION INTRAVENOUS at 20:52

## 2023-02-25 RX ADMIN — SODIUM CHLORIDE, POTASSIUM CHLORIDE, SODIUM LACTATE AND CALCIUM CHLORIDE: 600; 310; 30; 20 INJECTION, SOLUTION INTRAVENOUS at 20:35

## 2023-02-25 RX ADMIN — BUPIVACAINE HYDROCHLORIDE 1.7 ML: 7.5 INJECTION, SOLUTION EPIDURAL; RETROBULBAR at 20:23

## 2023-02-25 RX ADMIN — MORPHINE SULFATE 1.8 MG: 1 INJECTION, SOLUTION EPIDURAL; INTRATHECAL; INTRAVENOUS at 21:34

## 2023-02-25 RX ADMIN — MORPHINE SULFATE 2 MG: 1 INJECTION, SOLUTION EPIDURAL; INTRATHECAL; INTRAVENOUS at 21:26

## 2023-02-26 LAB
BASOPHILS # BLD AUTO: 0.04 10*3/MM3 (ref 0–0.2)
BASOPHILS NFR BLD AUTO: 0.2 % (ref 0–1.5)
DEPRECATED RDW RBC AUTO: 40 FL (ref 37–54)
EOSINOPHIL # BLD AUTO: 0.02 10*3/MM3 (ref 0–0.4)
EOSINOPHIL NFR BLD AUTO: 0.1 % (ref 0.3–6.2)
ERYTHROCYTE [DISTWIDTH] IN BLOOD BY AUTOMATED COUNT: 13.7 % (ref 12.3–15.4)
HCT VFR BLD AUTO: 27.3 % (ref 34–46.6)
HGB BLD-MCNC: 8.6 G/DL (ref 12–15.9)
IMM GRANULOCYTES # BLD AUTO: 0.12 10*3/MM3 (ref 0–0.05)
IMM GRANULOCYTES NFR BLD AUTO: 0.6 % (ref 0–0.5)
LYMPHOCYTES # BLD AUTO: 2.05 10*3/MM3 (ref 0.7–3.1)
LYMPHOCYTES NFR BLD AUTO: 10.7 % (ref 19.6–45.3)
MCH RBC QN AUTO: 25.2 PG (ref 26.6–33)
MCHC RBC AUTO-ENTMCNC: 31.5 G/DL (ref 31.5–35.7)
MCV RBC AUTO: 80.1 FL (ref 79–97)
MONOCYTES # BLD AUTO: 0.77 10*3/MM3 (ref 0.1–0.9)
MONOCYTES NFR BLD AUTO: 4 % (ref 5–12)
NEUTROPHILS NFR BLD AUTO: 16.12 10*3/MM3 (ref 1.7–7)
NEUTROPHILS NFR BLD AUTO: 84.4 % (ref 42.7–76)
NRBC BLD AUTO-RTO: 0.3 /100 WBC (ref 0–0.2)
PLATELET # BLD AUTO: 163 10*3/MM3 (ref 140–450)
PMV BLD AUTO: 13.7 FL (ref 6–12)
RBC # BLD AUTO: 3.41 10*6/MM3 (ref 3.77–5.28)
WBC NRBC COR # BLD: 19.12 10*3/MM3 (ref 3.4–10.8)

## 2023-02-26 PROCEDURE — 85025 COMPLETE CBC W/AUTO DIFF WBC: CPT | Performed by: OBSTETRICS & GYNECOLOGY

## 2023-02-26 PROCEDURE — 25010000002 IRON SUCROSE PER 1 MG: Performed by: OBSTETRICS & GYNECOLOGY

## 2023-02-26 PROCEDURE — 25010000002 HYDRALAZINE PER 20 MG: Performed by: OBSTETRICS & GYNECOLOGY

## 2023-02-26 PROCEDURE — 25010000002 KETOROLAC TROMETHAMINE PER 15 MG: Performed by: OBSTETRICS & GYNECOLOGY

## 2023-02-26 PROCEDURE — 0503F POSTPARTUM CARE VISIT: CPT | Performed by: OBSTETRICS & GYNECOLOGY

## 2023-02-26 PROCEDURE — 63710000001 DIPHENHYDRAMINE PER 50 MG: Performed by: OBSTETRICS & GYNECOLOGY

## 2023-02-26 RX ORDER — KETOROLAC TROMETHAMINE 15 MG/ML
15 INJECTION, SOLUTION INTRAMUSCULAR; INTRAVENOUS EVERY 6 HOURS
Status: COMPLETED | OUTPATIENT
Start: 2023-02-26 | End: 2023-02-26

## 2023-02-26 RX ORDER — OXYCODONE HYDROCHLORIDE 10 MG/1
10 TABLET ORAL EVERY 4 HOURS PRN
Status: DISCONTINUED | OUTPATIENT
Start: 2023-02-26 | End: 2023-02-28 | Stop reason: HOSPADM

## 2023-02-26 RX ORDER — ONDANSETRON 2 MG/ML
4 INJECTION INTRAMUSCULAR; INTRAVENOUS EVERY 6 HOURS PRN
Status: DISCONTINUED | OUTPATIENT
Start: 2023-02-26 | End: 2023-02-28 | Stop reason: HOSPADM

## 2023-02-26 RX ORDER — ACETAMINOPHEN 325 MG/1
650 TABLET ORAL EVERY 6 HOURS
Status: DISCONTINUED | OUTPATIENT
Start: 2023-02-27 | End: 2023-02-28 | Stop reason: HOSPADM

## 2023-02-26 RX ORDER — HYDROCORTISONE 25 MG/G
CREAM TOPICAL 3 TIMES DAILY PRN
Status: DISCONTINUED | OUTPATIENT
Start: 2023-02-26 | End: 2023-02-28 | Stop reason: HOSPADM

## 2023-02-26 RX ORDER — ACETAMINOPHEN 500 MG
1000 TABLET ORAL EVERY 6 HOURS
Status: COMPLETED | OUTPATIENT
Start: 2023-02-26 | End: 2023-02-27

## 2023-02-26 RX ORDER — ACETAMINOPHEN 325 MG/1
650 TABLET ORAL ONCE
Status: DISCONTINUED | OUTPATIENT
Start: 2023-02-26 | End: 2023-02-28 | Stop reason: HOSPADM

## 2023-02-26 RX ORDER — HYDRALAZINE HYDROCHLORIDE 20 MG/ML
5-10 INJECTION INTRAMUSCULAR; INTRAVENOUS
Status: DISCONTINUED | OUTPATIENT
Start: 2023-02-26 | End: 2023-02-28 | Stop reason: HOSPADM

## 2023-02-26 RX ORDER — HYDROXYZINE 50 MG/1
50 TABLET, FILM COATED ORAL EVERY 6 HOURS PRN
Status: DISCONTINUED | OUTPATIENT
Start: 2023-02-26 | End: 2023-02-28 | Stop reason: HOSPADM

## 2023-02-26 RX ORDER — LABETALOL HYDROCHLORIDE 5 MG/ML
20-80 INJECTION, SOLUTION INTRAVENOUS
Status: DISCONTINUED | OUTPATIENT
Start: 2023-02-26 | End: 2023-02-28 | Stop reason: HOSPADM

## 2023-02-26 RX ORDER — IBUPROFEN 600 MG/1
600 TABLET ORAL EVERY 6 HOURS
Status: DISCONTINUED | OUTPATIENT
Start: 2023-02-27 | End: 2023-02-28 | Stop reason: HOSPADM

## 2023-02-26 RX ORDER — OXYCODONE HYDROCHLORIDE 5 MG/1
5 TABLET ORAL EVERY 4 HOURS PRN
Status: DISCONTINUED | OUTPATIENT
Start: 2023-02-26 | End: 2023-02-28 | Stop reason: HOSPADM

## 2023-02-26 RX ORDER — ONDANSETRON 4 MG/1
4 TABLET, FILM COATED ORAL EVERY 8 HOURS PRN
Status: DISCONTINUED | OUTPATIENT
Start: 2023-02-26 | End: 2023-02-28 | Stop reason: HOSPADM

## 2023-02-26 RX ORDER — NIFEDIPINE 10 MG/1
10-20 CAPSULE ORAL
Status: DISCONTINUED | OUTPATIENT
Start: 2023-02-26 | End: 2023-02-28 | Stop reason: HOSPADM

## 2023-02-26 RX ORDER — DIPHENHYDRAMINE HCL 25 MG
50 CAPSULE ORAL ONCE
Status: COMPLETED | OUTPATIENT
Start: 2023-02-26 | End: 2023-02-26

## 2023-02-26 RX ORDER — PRENATAL VIT/IRON FUM/FOLIC AC 27MG-0.8MG
1 TABLET ORAL DAILY
Status: DISCONTINUED | OUTPATIENT
Start: 2023-02-26 | End: 2023-02-28 | Stop reason: HOSPADM

## 2023-02-26 RX ORDER — PROMETHAZINE HYDROCHLORIDE 12.5 MG/1
12.5 TABLET ORAL EVERY 4 HOURS PRN
Status: DISCONTINUED | OUTPATIENT
Start: 2023-02-26 | End: 2023-02-28 | Stop reason: HOSPADM

## 2023-02-26 RX ADMIN — ACETAMINOPHEN 1000 MG: 500 TABLET ORAL at 02:42

## 2023-02-26 RX ADMIN — KETOROLAC TROMETHAMINE 15 MG: 15 INJECTION, SOLUTION INTRAMUSCULAR; INTRAVENOUS at 20:56

## 2023-02-26 RX ADMIN — IRON SUCROSE 300 MG: 20 INJECTION, SOLUTION INTRAVENOUS at 12:44

## 2023-02-26 RX ADMIN — HYDRALAZINE HYDROCHLORIDE 5 MG: 20 INJECTION INTRAMUSCULAR; INTRAVENOUS at 00:58

## 2023-02-26 RX ADMIN — Medication 1 TABLET: at 08:27

## 2023-02-26 RX ADMIN — DIPHENHYDRAMINE HYDROCHLORIDE 50 MG: 25 CAPSULE ORAL at 12:39

## 2023-02-26 RX ADMIN — KETOROLAC TROMETHAMINE 15 MG: 15 INJECTION, SOLUTION INTRAMUSCULAR; INTRAVENOUS at 01:56

## 2023-02-26 RX ADMIN — ACETAMINOPHEN 1000 MG: 500 TABLET ORAL at 10:41

## 2023-02-26 RX ADMIN — BUPRENORPHINE 22 MG: 8 TABLET SUBLINGUAL at 08:27

## 2023-02-26 RX ADMIN — ACETAMINOPHEN 1000 MG: 500 TABLET ORAL at 17:45

## 2023-02-26 RX ADMIN — KETOROLAC TROMETHAMINE 15 MG: 15 INJECTION, SOLUTION INTRAMUSCULAR; INTRAVENOUS at 14:36

## 2023-02-26 RX ADMIN — KETOROLAC TROMETHAMINE 15 MG: 15 INJECTION, SOLUTION INTRAMUSCULAR; INTRAVENOUS at 08:27

## 2023-02-27 PROCEDURE — 0503F POSTPARTUM CARE VISIT: CPT | Performed by: OBSTETRICS & GYNECOLOGY

## 2023-02-27 RX ORDER — DOCUSATE SODIUM 100 MG/1
100 CAPSULE, LIQUID FILLED ORAL 2 TIMES DAILY
Status: DISCONTINUED | OUTPATIENT
Start: 2023-02-27 | End: 2023-02-28 | Stop reason: HOSPADM

## 2023-02-27 RX ADMIN — Medication 1 TABLET: at 08:52

## 2023-02-27 RX ADMIN — ACETAMINOPHEN 1000 MG: 500 TABLET ORAL at 01:28

## 2023-02-27 RX ADMIN — ACETAMINOPHEN 650 MG: 325 TABLET, FILM COATED ORAL at 17:15

## 2023-02-27 RX ADMIN — IBUPROFEN 600 MG: 600 TABLET, FILM COATED ORAL at 03:17

## 2023-02-27 RX ADMIN — BUPRENORPHINE 22 MG: 8 TABLET SUBLINGUAL at 11:14

## 2023-02-27 RX ADMIN — ACETAMINOPHEN 650 MG: 325 TABLET, FILM COATED ORAL at 08:52

## 2023-02-27 RX ADMIN — ACETAMINOPHEN 650 MG: 325 TABLET, FILM COATED ORAL at 23:13

## 2023-02-27 RX ADMIN — IBUPROFEN 600 MG: 600 TABLET, FILM COATED ORAL at 18:55

## 2023-02-27 RX ADMIN — IBUPROFEN 600 MG: 600 TABLET, FILM COATED ORAL at 12:15

## 2023-02-27 RX ADMIN — DOCUSATE SODIUM 100 MG: 100 CAPSULE, LIQUID FILLED ORAL at 20:37

## 2023-02-28 VITALS
RESPIRATION RATE: 16 BRPM | TEMPERATURE: 99 F | SYSTOLIC BLOOD PRESSURE: 127 MMHG | DIASTOLIC BLOOD PRESSURE: 82 MMHG | OXYGEN SATURATION: 96 % | HEART RATE: 91 BPM

## 2023-02-28 PROCEDURE — 0503F POSTPARTUM CARE VISIT: CPT | Performed by: OBSTETRICS & GYNECOLOGY

## 2023-02-28 RX ORDER — OXYCODONE HYDROCHLORIDE 5 MG/1
5 TABLET ORAL EVERY 6 HOURS PRN
Qty: 20 TABLET | Refills: 0 | Status: SHIPPED | OUTPATIENT
Start: 2023-02-28 | End: 2023-03-05

## 2023-02-28 RX ORDER — FERROUS SULFATE 325(65) MG
325 TABLET ORAL
Qty: 90 TABLET | Refills: 1 | Status: SHIPPED | OUTPATIENT
Start: 2023-02-28

## 2023-02-28 RX ORDER — IBUPROFEN 600 MG/1
600 TABLET ORAL EVERY 6 HOURS
Qty: 30 TABLET | Refills: 0 | Status: SHIPPED | OUTPATIENT
Start: 2023-02-28

## 2023-02-28 RX ORDER — NALOXONE HYDROCHLORIDE 4 MG/.1ML
1 SPRAY NASAL AS NEEDED
Qty: 2 EACH | Refills: 0 | Status: SHIPPED | OUTPATIENT
Start: 2023-02-28

## 2023-02-28 RX ADMIN — IBUPROFEN 600 MG: 600 TABLET, FILM COATED ORAL at 01:13

## 2023-02-28 RX ADMIN — DOCUSATE SODIUM 100 MG: 100 CAPSULE, LIQUID FILLED ORAL at 09:21

## 2023-02-28 RX ADMIN — IBUPROFEN 600 MG: 600 TABLET, FILM COATED ORAL at 09:21

## 2023-02-28 RX ADMIN — BUPRENORPHINE 22 MG: 8 TABLET SUBLINGUAL at 09:21

## 2023-02-28 RX ADMIN — Medication 1 TABLET: at 09:21

## 2023-02-28 RX ADMIN — ACETAMINOPHEN 650 MG: 325 TABLET, FILM COATED ORAL at 05:40

## 2023-03-02 LAB
CANNABINOIDS UR QL CFM: POSITIVE
LABORATORY COMMENT REPORT: ABNORMAL
THC UR CFM-MCNC: 127 NG/ML

## 2023-03-02 NOTE — PROGRESS NOTES
Continued Stay Note  [facility]     Patient Name: Glenna Miller  MRN: [id number]  Today's Date: 3/2/2023    Admit Date: 2/25/2023    Plan: Infant may d/c home to mother, only if Sharri Simon (maternal great grandma) is present with mother at discharge. JACY Gomez.   Discharge Plan     Row Name 03/02/23 0957       Plan    Plan Infant may d/c home to mother, only if Sharri Simon (maternal great grandma) is present with mother at discharge. JACY Gomez.    Plan Comments Mother: Glenna Miller, MRN: [id number]; infant: Saray “Danielle” Paul, MRN: [id number]. CPS worker provided a copy infant’s discharge plan. Per CPS, infant will d/c home to mother, only if Sharri Simon (maternal great grandma) is present with mother at discharge. A copy of the DCBS letter has been placed on infant’s chart with BRENDON saleem. GISELEW has relayed all this information to infant’s RN. JACY Gomez.               Discharge Codes    No documentation.               Expected Discharge Date and Time     Expected Discharge Date Expected Discharge Time    Feb 28, 2023             MAC Enamorado

## 2023-03-03 LAB
AMPHET UR CFM-MCNC: 2115 NG/ML
AMPHET UR QL CFM: POSITIVE
AMPHETAMINES UR QL: POSITIVE
LABORATORY COMMENT REPORT: ABNORMAL
METHAMPHET UR CFM-MCNC: >3000 NG/ML
METHAMPHET UR QL CFM: POSITIVE

## 2023-03-05 LAB — BUPRENORPHINE+NOR UR QL: POSITIVE

## 2023-03-06 NOTE — PROGRESS NOTES
"Enter Query Response Below      Query Response: THC use complicating pregnancy             If applicable, please update the problem list.     Patient: Glenna Miller        : 1995  Account: [id number]           Admit Date: 2023        How to Respond to this query:       a. Click New Note     b. Answer query within the yellow box.                c. Update the Problem List, if applicable.    Dr. Franklin,    27 y.o. 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  with documentation of \"Of note, mother's UDS was positive for THC prenatally on 23. Mother's UDS was positive for Amphet/Methamphet & THC on admit.\" Discharge summary with urine drug screen noted as pending.    After study, can the patient's condition on admission be further specified as?  -THC use complicating childbirth  -THC abuse complication childbirth  -Other-specify __________________  -Unable to determine    By submitting this query, we are merely seeking further clarification of documentation to accurately reflect all conditions that you are monitoring, evaluating, treating or that extend the hospitalization or utilize additional resources of care. Please utilize your independent clinical judgment when addressing the question(s) above.     This query and your response, once completed, will be entered into the legal medical record.    Sincerely,  Mavis Miller RN, BSN  [email or web address]  Clinical Documentation Integrity Program   "

## 2023-11-01 ENCOUNTER — OFFICE VISIT (OUTPATIENT)
Dept: OBSTETRICS AND GYNECOLOGY | Facility: CLINIC | Age: 28
End: 2023-11-01
Payer: COMMERCIAL

## 2023-11-01 VITALS
HEART RATE: 79 BPM | HEIGHT: 62 IN | WEIGHT: 147 LBS | SYSTOLIC BLOOD PRESSURE: 107 MMHG | DIASTOLIC BLOOD PRESSURE: 62 MMHG | BODY MASS INDEX: 27.05 KG/M2

## 2023-11-01 DIAGNOSIS — Z01.419 ENCOUNTER FOR GYNECOLOGICAL EXAMINATION WITHOUT ABNORMAL FINDING: Primary | ICD-10-CM

## 2023-11-01 DIAGNOSIS — R87.810 ASCUS WITH POSITIVE HIGH RISK HPV CERVICAL: ICD-10-CM

## 2023-11-01 DIAGNOSIS — R87.610 ASCUS WITH POSITIVE HIGH RISK HPV CERVICAL: ICD-10-CM

## 2023-11-01 DIAGNOSIS — Z30.017 ENCOUNTER FOR INITIAL PRESCRIPTION OF IMPLANTABLE SUBDERMAL CONTRACEPTIVE: ICD-10-CM

## 2023-11-01 RX ORDER — SERTRALINE HYDROCHLORIDE 25 MG/1
1 TABLET, FILM COATED ORAL DAILY
COMMUNITY
Start: 2023-09-25

## 2023-11-01 RX ORDER — BUPRENORPHINE HYDROCHLORIDE AND NALOXONE HYDROCHLORIDE DIHYDRATE 8; 2 MG/1; MG/1
TABLET SUBLINGUAL
COMMUNITY
Start: 2023-10-17

## 2023-11-01 RX ORDER — BUPROPION HYDROCHLORIDE 300 MG/1
1 TABLET ORAL DAILY
COMMUNITY
Start: 2023-10-17

## 2023-11-01 NOTE — PROGRESS NOTES
GYN Annual Exam     CC- Here for annual exam.     Glenna Miller is a 28 y.o. female who presents for annual well woman exam. Periods are regular every 28-30 days, lasting 6 days. Dysmenorrhea:mild, occurring premenstrually. Cyclic symptoms include none. No intermenstrual bleeding, spotting, or discharge.  Pt is interested in Nexplanon.     OB History          3    Para   3    Term   3            AB        Living   3         SAB        IAB        Ectopic        Molar        Multiple   0    Live Births   3                Current contraception: none  History of abnormal Pap smear: no  Family history of uterine, colon or ovarian cancer: no  History of abnormal mammogram: no  Family history of breast cancer: no  Last Pap : 2023 ASCUS HPV pos     Past Medical History:   Diagnosis Date    Drug abuse in remission     Hepatitis C     Hepatitis C        Past Surgical History:   Procedure Laterality Date     SECTION N/A 2023    Procedure:  SECTION PRIMARY;  Surgeon: Mary Lou Franklin MD;  Location: [facility] LABOR DELIVERY;  Service: Obstetrics/Gynecology;  Laterality: N/A;    WISDOM TOOTH EXTRACTION           Current Outpatient Medications:     buprenorphine-naloxone (SUBOXONE) 8-2 MG per SL tablet, DISSOLVE 2 AND 1/2 TABLETS UNDER THE TONGUE DAILY, Disp: , Rfl:     buPROPion XL (WELLBUTRIN XL) 300 MG 24 hr tablet, Take 1 tablet by mouth Daily., Disp: , Rfl:     sertraline (ZOLOFT) 25 MG tablet, Take 1 tablet by mouth Daily., Disp: , Rfl:     naloxone (NARCAN) 4 MG/0.1ML nasal spray, Use 1 spray into the nostril(s) as directed by provider As Needed (overdose)., Disp: 2 each, Rfl: 0    No Known Allergies    Social History     Tobacco Use    Smoking status: Every Day     Packs/day: 2     Types: Cigarettes    Smokeless tobacco: Never   Vaping Use    Vaping Use: Never used   Substance Use Topics    Alcohol use: Not Currently    Drug use: Not Currently       Family History   Problem  "Relation Age of Onset    Thyroid disease Mother     Heart disease Father     Thyroid disease Maternal Grandmother     Breast cancer Neg Hx     Ovarian cancer Neg Hx     Uterine cancer Neg Hx     Colon cancer Neg Hx     Deep vein thrombosis Neg Hx     Pulmonary embolism Neg Hx        Review of Systems   Constitutional:  Negative for chills and fever.   Gastrointestinal:  Negative for abdominal pain, constipation and diarrhea.   Genitourinary:  Negative for menstrual problem, pelvic pain, vaginal bleeding and vaginal discharge.   All other systems reviewed and are negative.      /62   Pulse 79   Ht 157.5 cm (62\")   Wt 66.7 kg (147 lb)   LMP 10/05/2023 (Exact Date)   BMI 26.89 kg/m²     Physical Exam  Constitutional:       General: She is not in acute distress.     Appearance: She is well-developed and normal weight.   Genitourinary:      Vulva normal.      Right Labia: No lesions or Bartholin's cyst.     Left Labia: No lesions or Bartholin's cyst.     No inguinal adenopathy present in the right or left side.     No vaginal discharge or bleeding.        Right Adnexa: not tender, not full and no mass present.     Left Adnexa: not tender, not full and no mass present.     No cervical motion tenderness or friability.      Uterus is not enlarged or tender.      No uterine mass detected.     Uterus is anteverted.   Breasts:     Right: No inverted nipple, mass or nipple discharge.      Left: No inverted nipple, mass or nipple discharge.   HENT:      Head: Normocephalic and atraumatic.      Nose: Nose normal.   Eyes:      Conjunctiva/sclera: Conjunctivae normal.      Pupils: Pupils are equal, round, and reactive to light.   Neck:      Thyroid: No thyromegaly.   Cardiovascular:      Rate and Rhythm: Normal rate and regular rhythm.      Heart sounds: Normal heart sounds. No murmur heard.  Pulmonary:      Effort: Pulmonary effort is normal. No respiratory distress.      Breath sounds: Normal breath sounds. "   Abdominal:      General: Abdomen is flat. There is no distension.      Palpations: Abdomen is soft.      Tenderness: There is no abdominal tenderness.   Musculoskeletal:         General: No deformity. Normal range of motion.      Cervical back: Normal range of motion and neck supple.      Right lower leg: No edema.      Left lower leg: No edema.   Lymphadenopathy:      Lower Body: No right inguinal adenopathy. No left inguinal adenopathy.   Neurological:      Mental Status: She is alert and oriented to person, place, and time.   Skin:     General: Skin is warm and dry.      Findings: No erythema.   Psychiatric:         Behavior: Behavior normal.         Thought Content: Thought content normal.         Judgment: Judgment normal.   Vitals reviewed. Exam conducted with a chaperone present.               Assessment     Diagnoses and all orders for this visit:    1. Encounter for gynecological examination without abnormal finding (Primary)  -     IGP, Rfx Aptima HPV ASCU    2. Encounter for initial prescription of implantable subdermal contraceptive    3. ASCUS with positive high risk HPV cervical  -     IGP, Rfx Aptima HPV ASCU    1) GYN exam   Expectations reviewed     2) ASCUS HPV +   Expectations reviewed.    3) Nexplanon   Discussed timing of procedure   Discussed procedure and menstrual patterns   To proceed     Plan     1) Breast Health - Clinical breast exam yearly, Discussed [employer] cancer society recommendations for breast cancer screening, and Self breast awareness monthly  CBE normal   2) Pap - updated today   3) Smoking status- cessation encouraged   4) Encouraged between 7-8 hours of good sleep per night.   5) Follow up prn and one year.       Edu Baltazar MD   11/1/2023  11:58 EDT

## 2023-11-05 LAB
CONV .: NORMAL
CYTOLOGIST CVX/VAG CYTO: NORMAL
CYTOLOGY CVX/VAG DOC CYTO: NORMAL
CYTOLOGY CVX/VAG DOC THIN PREP: NORMAL
DX ICD CODE: NORMAL
HIV 1 & 2 AB SER-IMP: NORMAL
OTHER STN SPEC: NORMAL
STAT OF ADQ CVX/VAG CYTO-IMP: NORMAL

## 2023-11-10 ENCOUNTER — TELEPHONE (OUTPATIENT)
Dept: OBSTETRICS AND GYNECOLOGY | Facility: CLINIC | Age: 28
End: 2023-11-10
Payer: COMMERCIAL

## 2023-11-10 NOTE — TELEPHONE ENCOUNTER
Pt is calling to schedule nexplanon insertion, says she started cycle two days ago. Is she able to be added on next week?    Thanks!

## 2023-11-20 ENCOUNTER — TELEPHONE (OUTPATIENT)
Dept: OBSTETRICS AND GYNECOLOGY | Facility: CLINIC | Age: 28
End: 2023-11-20
Payer: COMMERCIAL

## (undated) DEVICE — 3M(TM) TEGADERM(TM) TRANSPARENT FILM DRESSING FRAME STYLE 1627: Brand: 3M™ TEGADERM™

## (undated) DEVICE — SUT VIC 0 CT 36IN J958H

## (undated) DEVICE — SLV SCD CALF HEMOFORCE DVT THERP REPROC MD

## (undated) DEVICE — SUT MNCRYL PLS ANTIB UD 4/0 PS2 18IN

## (undated) DEVICE — GLV SURG SENSICARE MICRO PF LF 6.5 STRL

## (undated) DEVICE — ANTIBACTERIAL UNDYED BRAIDED (POLYGLACTIN 910), SYNTHETIC ABSORBABLE SUTURE: Brand: COATED VICRYL

## (undated) DEVICE — 3M™ STERI-STRIP™ COMPOUND BENZOIN TINCTURE 40 BAGS/CARTON 4 CARTONS/CASE C1544: Brand: 3M™ STERI-STRIP™

## (undated) DEVICE — STRIP,CLOSURE,WOUND,MEDI-STRIP,1/2X4: Brand: MEDLINE

## (undated) DEVICE — SOL IRR H2O BTL 1000ML STRL

## (undated) DEVICE — GLV SURG SENSICARE MICRO PF LF 6 STRL